# Patient Record
Sex: FEMALE | Race: WHITE | NOT HISPANIC OR LATINO | URBAN - METROPOLITAN AREA
[De-identification: names, ages, dates, MRNs, and addresses within clinical notes are randomized per-mention and may not be internally consistent; named-entity substitution may affect disease eponyms.]

---

## 2023-02-26 ENCOUNTER — OUTPATIENT (OUTPATIENT)
Dept: OUTPATIENT SERVICES | Facility: HOSPITAL | Age: 48
LOS: 1 days | End: 2023-02-26
Payer: COMMERCIAL

## 2023-02-26 ENCOUNTER — APPOINTMENT (OUTPATIENT)
Dept: MRI IMAGING | Facility: CLINIC | Age: 48
End: 2023-02-26
Payer: COMMERCIAL

## 2023-02-26 DIAGNOSIS — Z00.8 ENCOUNTER FOR OTHER GENERAL EXAMINATION: ICD-10-CM

## 2023-02-26 PROCEDURE — 73718 MRI LOWER EXTREMITY W/O DYE: CPT | Mod: 26,RT

## 2023-02-26 PROCEDURE — 73718 MRI LOWER EXTREMITY W/O DYE: CPT

## 2023-04-03 ENCOUNTER — APPOINTMENT (OUTPATIENT)
Dept: OBGYN | Facility: CLINIC | Age: 48
End: 2023-04-03
Payer: COMMERCIAL

## 2023-04-03 VITALS
HEIGHT: 61 IN | BODY MASS INDEX: 35.87 KG/M2 | DIASTOLIC BLOOD PRESSURE: 80 MMHG | WEIGHT: 190 LBS | SYSTOLIC BLOOD PRESSURE: 134 MMHG

## 2023-04-03 DIAGNOSIS — Z30.41 ENCOUNTER FOR SURVEILLANCE OF CONTRACEPTIVE PILLS: ICD-10-CM

## 2023-04-03 DIAGNOSIS — Z01.419 ENCOUNTER FOR GYNECOLOGICAL EXAMINATION (GENERAL) (ROUTINE) W/OUT ABNORMAL FINDINGS: ICD-10-CM

## 2023-04-03 PROCEDURE — 82270 OCCULT BLOOD FECES: CPT

## 2023-04-03 PROCEDURE — 99386 PREV VISIT NEW AGE 40-64: CPT

## 2023-04-03 NOTE — HISTORY OF PRESENT ILLNESS
[FreeTextEntry1] : Patient is a 47-year-old female presents for routine annual gynecologic examination and oral contraceptive pill surveillance and maintenance visit.  Patient is on Seasonique and states she is doing well and would like to continue this prescription.  Patient with a significant family history of a sister with breast cancer genetic testing was negative.  Patient's last mammogram and breast sonogram was 1 year ago.

## 2023-04-04 LAB — HPV HIGH+LOW RISK DNA PNL CVX: NOT DETECTED

## 2023-04-07 LAB — CYTOLOGY CVX/VAG DOC THIN PREP: ABNORMAL

## 2024-01-09 ENCOUNTER — INPATIENT (INPATIENT)
Facility: HOSPITAL | Age: 49
LOS: 2 days | Discharge: HOME | DRG: 641 | End: 2024-01-12
Attending: HOSPITALIST | Admitting: INTERNAL MEDICINE
Payer: COMMERCIAL

## 2024-01-09 VITALS — WEIGHT: 190.04 LBS | HEIGHT: 61 IN

## 2024-01-09 LAB
APPEARANCE UR: CLEAR — SIGNIFICANT CHANGE UP
APPEARANCE UR: CLEAR — SIGNIFICANT CHANGE UP
BASOPHILS # BLD AUTO: 0.03 K/UL — SIGNIFICANT CHANGE UP (ref 0–0.2)
BASOPHILS # BLD AUTO: 0.03 K/UL — SIGNIFICANT CHANGE UP (ref 0–0.2)
BASOPHILS NFR BLD AUTO: 0.2 % — SIGNIFICANT CHANGE UP (ref 0–2)
BASOPHILS NFR BLD AUTO: 0.2 % — SIGNIFICANT CHANGE UP (ref 0–2)
BILIRUB UR-MCNC: NEGATIVE — SIGNIFICANT CHANGE UP
BILIRUB UR-MCNC: NEGATIVE — SIGNIFICANT CHANGE UP
COLOR SPEC: YELLOW — SIGNIFICANT CHANGE UP
COLOR SPEC: YELLOW — SIGNIFICANT CHANGE UP
DIFF PNL FLD: NEGATIVE — SIGNIFICANT CHANGE UP
DIFF PNL FLD: NEGATIVE — SIGNIFICANT CHANGE UP
EOSINOPHIL # BLD AUTO: 0.15 K/UL — SIGNIFICANT CHANGE UP (ref 0–0.5)
EOSINOPHIL # BLD AUTO: 0.15 K/UL — SIGNIFICANT CHANGE UP (ref 0–0.5)
EOSINOPHIL NFR BLD AUTO: 1.1 % — SIGNIFICANT CHANGE UP (ref 0–6)
EOSINOPHIL NFR BLD AUTO: 1.1 % — SIGNIFICANT CHANGE UP (ref 0–6)
GLUCOSE UR QL: NEGATIVE MG/DL — SIGNIFICANT CHANGE UP
GLUCOSE UR QL: NEGATIVE MG/DL — SIGNIFICANT CHANGE UP
HCT VFR BLD CALC: 37.3 % — SIGNIFICANT CHANGE UP (ref 34.5–45)
HCT VFR BLD CALC: 37.3 % — SIGNIFICANT CHANGE UP (ref 34.5–45)
HGB BLD-MCNC: 12.5 G/DL — SIGNIFICANT CHANGE UP (ref 11.5–15.5)
HGB BLD-MCNC: 12.5 G/DL — SIGNIFICANT CHANGE UP (ref 11.5–15.5)
IMM GRANULOCYTES NFR BLD AUTO: 0.4 % — SIGNIFICANT CHANGE UP (ref 0–0.9)
IMM GRANULOCYTES NFR BLD AUTO: 0.4 % — SIGNIFICANT CHANGE UP (ref 0–0.9)
KETONES UR-MCNC: NEGATIVE MG/DL — SIGNIFICANT CHANGE UP
KETONES UR-MCNC: NEGATIVE MG/DL — SIGNIFICANT CHANGE UP
LEUKOCYTE ESTERASE UR-ACNC: ABNORMAL
LEUKOCYTE ESTERASE UR-ACNC: ABNORMAL
LYMPHOCYTES # BLD AUTO: 29.3 % — SIGNIFICANT CHANGE UP (ref 13–44)
LYMPHOCYTES # BLD AUTO: 29.3 % — SIGNIFICANT CHANGE UP (ref 13–44)
LYMPHOCYTES # BLD AUTO: 4.11 K/UL — HIGH (ref 1–3.3)
LYMPHOCYTES # BLD AUTO: 4.11 K/UL — HIGH (ref 1–3.3)
MCHC RBC-ENTMCNC: 27.8 PG — SIGNIFICANT CHANGE UP (ref 27–34)
MCHC RBC-ENTMCNC: 27.8 PG — SIGNIFICANT CHANGE UP (ref 27–34)
MCHC RBC-ENTMCNC: 33.5 GM/DL — SIGNIFICANT CHANGE UP (ref 32–36)
MCHC RBC-ENTMCNC: 33.5 GM/DL — SIGNIFICANT CHANGE UP (ref 32–36)
MCV RBC AUTO: 83.1 FL — SIGNIFICANT CHANGE UP (ref 80–100)
MCV RBC AUTO: 83.1 FL — SIGNIFICANT CHANGE UP (ref 80–100)
MONOCYTES # BLD AUTO: 0.93 K/UL — HIGH (ref 0–0.9)
MONOCYTES # BLD AUTO: 0.93 K/UL — HIGH (ref 0–0.9)
MONOCYTES NFR BLD AUTO: 6.6 % — SIGNIFICANT CHANGE UP (ref 2–14)
MONOCYTES NFR BLD AUTO: 6.6 % — SIGNIFICANT CHANGE UP (ref 2–14)
NEUTROPHILS # BLD AUTO: 8.77 K/UL — HIGH (ref 1.8–7.4)
NEUTROPHILS # BLD AUTO: 8.77 K/UL — HIGH (ref 1.8–7.4)
NEUTROPHILS NFR BLD AUTO: 62.4 % — SIGNIFICANT CHANGE UP (ref 43–77)
NEUTROPHILS NFR BLD AUTO: 62.4 % — SIGNIFICANT CHANGE UP (ref 43–77)
NITRITE UR-MCNC: NEGATIVE — SIGNIFICANT CHANGE UP
NITRITE UR-MCNC: NEGATIVE — SIGNIFICANT CHANGE UP
PH UR: 5.5 — SIGNIFICANT CHANGE UP (ref 5–8)
PH UR: 5.5 — SIGNIFICANT CHANGE UP (ref 5–8)
PLATELET # BLD AUTO: 311 K/UL — SIGNIFICANT CHANGE UP (ref 150–400)
PLATELET # BLD AUTO: 311 K/UL — SIGNIFICANT CHANGE UP (ref 150–400)
PROT UR-MCNC: SIGNIFICANT CHANGE UP MG/DL
PROT UR-MCNC: SIGNIFICANT CHANGE UP MG/DL
RBC # BLD: 4.49 M/UL — SIGNIFICANT CHANGE UP (ref 3.8–5.2)
RBC # BLD: 4.49 M/UL — SIGNIFICANT CHANGE UP (ref 3.8–5.2)
RBC # FLD: 13.9 % — SIGNIFICANT CHANGE UP (ref 10.3–14.5)
RBC # FLD: 13.9 % — SIGNIFICANT CHANGE UP (ref 10.3–14.5)
SP GR SPEC: 1.01 — SIGNIFICANT CHANGE UP (ref 1–1.03)
SP GR SPEC: 1.01 — SIGNIFICANT CHANGE UP (ref 1–1.03)
UROBILINOGEN FLD QL: 0.2 MG/DL — SIGNIFICANT CHANGE UP (ref 0.2–1)
UROBILINOGEN FLD QL: 0.2 MG/DL — SIGNIFICANT CHANGE UP (ref 0.2–1)
WBC # BLD: 14.04 K/UL — HIGH (ref 3.8–10.5)
WBC # BLD: 14.04 K/UL — HIGH (ref 3.8–10.5)
WBC # FLD AUTO: 14.04 K/UL — HIGH (ref 3.8–10.5)
WBC # FLD AUTO: 14.04 K/UL — HIGH (ref 3.8–10.5)

## 2024-01-09 PROCEDURE — 99285 EMERGENCY DEPT VISIT HI MDM: CPT

## 2024-01-09 RX ORDER — SODIUM CHLORIDE 9 MG/ML
2000 INJECTION INTRAMUSCULAR; INTRAVENOUS; SUBCUTANEOUS ONCE
Refills: 0 | Status: COMPLETED | OUTPATIENT
Start: 2024-01-09 | End: 2024-01-09

## 2024-01-09 RX ADMIN — SODIUM CHLORIDE 2000 MILLILITER(S): 9 INJECTION INTRAMUSCULAR; INTRAVENOUS; SUBCUTANEOUS at 23:40

## 2024-01-09 NOTE — ED ADULT NURSE NOTE - OBJECTIVE STATEMENT
Pt. presents to ED c/o abnormal labs. Pt. reports having lab work done Friday and was told to come to the ED tonight due to decreased kidney function and elevated calcium. Denies hx kidney issues, as per pt. she feels her abdomen is bloated this past few days, denies Vomiting/diarrhea and urinary prob,

## 2024-01-09 NOTE — ED ADULT TRIAGE NOTE - CHIEF COMPLAINT QUOTE
Pt. presents to ED c/o abnormal labs. Pt. reports having labwork done Friday and was told to come to the ED tonight due to decreased kidney function and elevated calcium. Pt. endorsing urinary frequency at this time. No other complaints. Denies hx kidney issues

## 2024-01-09 NOTE — ED ADULT NURSE NOTE - PRO INTERPRETER NEED 2
Pre-application: Motor, sensory, and vascular responses intact in the injured extremity./Post-application: Motor, sensory, and vascular responses intact in the injured extremity.
English

## 2024-01-10 DIAGNOSIS — E83.52 HYPERCALCEMIA: ICD-10-CM

## 2024-01-10 LAB
24R-OH-CALCIDIOL SERPL-MCNC: 33.2 NG/ML — SIGNIFICANT CHANGE UP (ref 30–80)
24R-OH-CALCIDIOL SERPL-MCNC: 33.2 NG/ML — SIGNIFICANT CHANGE UP (ref 30–80)
ADD ON TEST-SPECIMEN IN LAB: SIGNIFICANT CHANGE UP
ALBUMIN SERPL ELPH-MCNC: 4 G/DL — SIGNIFICANT CHANGE UP (ref 3.3–5)
ALBUMIN SERPL ELPH-MCNC: 4 G/DL — SIGNIFICANT CHANGE UP (ref 3.3–5)
ALBUMIN SERPL ELPH-MCNC: 4.3 G/DL — SIGNIFICANT CHANGE UP (ref 3.3–5)
ALBUMIN SERPL ELPH-MCNC: 4.3 G/DL — SIGNIFICANT CHANGE UP (ref 3.3–5)
ALP SERPL-CCNC: 118 U/L — SIGNIFICANT CHANGE UP (ref 40–120)
ALP SERPL-CCNC: 118 U/L — SIGNIFICANT CHANGE UP (ref 40–120)
ALP SERPL-CCNC: 119 U/L — SIGNIFICANT CHANGE UP (ref 40–120)
ALP SERPL-CCNC: 119 U/L — SIGNIFICANT CHANGE UP (ref 40–120)
ALT FLD-CCNC: 39 U/L — SIGNIFICANT CHANGE UP (ref 12–78)
ALT FLD-CCNC: 39 U/L — SIGNIFICANT CHANGE UP (ref 12–78)
ALT FLD-CCNC: 44 U/L — SIGNIFICANT CHANGE UP (ref 12–78)
ALT FLD-CCNC: 44 U/L — SIGNIFICANT CHANGE UP (ref 12–78)
ANION GAP SERPL CALC-SCNC: 5 MMOL/L — SIGNIFICANT CHANGE UP (ref 5–17)
ANION GAP SERPL CALC-SCNC: 5 MMOL/L — SIGNIFICANT CHANGE UP (ref 5–17)
ANION GAP SERPL CALC-SCNC: 7 MMOL/L — SIGNIFICANT CHANGE UP (ref 5–17)
ANION GAP SERPL CALC-SCNC: 7 MMOL/L — SIGNIFICANT CHANGE UP (ref 5–17)
AST SERPL-CCNC: 27 U/L — SIGNIFICANT CHANGE UP (ref 15–37)
AST SERPL-CCNC: 27 U/L — SIGNIFICANT CHANGE UP (ref 15–37)
AST SERPL-CCNC: 30 U/L — SIGNIFICANT CHANGE UP (ref 15–37)
AST SERPL-CCNC: 30 U/L — SIGNIFICANT CHANGE UP (ref 15–37)
BACTERIA # UR AUTO: NEGATIVE /HPF — SIGNIFICANT CHANGE UP
BACTERIA # UR AUTO: NEGATIVE /HPF — SIGNIFICANT CHANGE UP
BILIRUB SERPL-MCNC: 0.6 MG/DL — SIGNIFICANT CHANGE UP (ref 0.2–1.2)
BILIRUB SERPL-MCNC: 0.6 MG/DL — SIGNIFICANT CHANGE UP (ref 0.2–1.2)
BILIRUB SERPL-MCNC: 0.7 MG/DL — SIGNIFICANT CHANGE UP (ref 0.2–1.2)
BILIRUB SERPL-MCNC: 0.7 MG/DL — SIGNIFICANT CHANGE UP (ref 0.2–1.2)
BUN SERPL-MCNC: 28 MG/DL — HIGH (ref 7–23)
BUN SERPL-MCNC: 28 MG/DL — HIGH (ref 7–23)
BUN SERPL-MCNC: 36 MG/DL — HIGH (ref 7–23)
BUN SERPL-MCNC: 36 MG/DL — HIGH (ref 7–23)
CA-I BLD-SCNC: 1.52 MMOL/L — HIGH (ref 1.15–1.33)
CA-I BLD-SCNC: 1.52 MMOL/L — HIGH (ref 1.15–1.33)
CALCIUM SERPL-MCNC: 11.6 MG/DL — HIGH (ref 8.5–10.1)
CALCIUM SERPL-MCNC: 11.6 MG/DL — HIGH (ref 8.5–10.1)
CALCIUM SERPL-MCNC: 11.9 MG/DL — HIGH (ref 8.4–10.5)
CALCIUM SERPL-MCNC: 11.9 MG/DL — HIGH (ref 8.4–10.5)
CALCIUM SERPL-MCNC: 11.9 MG/DL — HIGH (ref 8.5–10.1)
CALCIUM SERPL-MCNC: 11.9 MG/DL — HIGH (ref 8.5–10.1)
CALCIUM SERPL-MCNC: 13.2 MG/DL — CRITICAL HIGH (ref 8.5–10.1)
CALCIUM SERPL-MCNC: 13.2 MG/DL — CRITICAL HIGH (ref 8.5–10.1)
CAST: 4 /LPF — SIGNIFICANT CHANGE UP (ref 0–4)
CAST: 4 /LPF — SIGNIFICANT CHANGE UP (ref 0–4)
CHLORIDE SERPL-SCNC: 103 MMOL/L — SIGNIFICANT CHANGE UP (ref 96–108)
CHLORIDE SERPL-SCNC: 103 MMOL/L — SIGNIFICANT CHANGE UP (ref 96–108)
CHLORIDE SERPL-SCNC: 108 MMOL/L — SIGNIFICANT CHANGE UP (ref 96–108)
CHLORIDE SERPL-SCNC: 108 MMOL/L — SIGNIFICANT CHANGE UP (ref 96–108)
CO2 SERPL-SCNC: 29 MMOL/L — SIGNIFICANT CHANGE UP (ref 22–31)
CO2 SERPL-SCNC: 29 MMOL/L — SIGNIFICANT CHANGE UP (ref 22–31)
CO2 SERPL-SCNC: 30 MMOL/L — SIGNIFICANT CHANGE UP (ref 22–31)
CO2 SERPL-SCNC: 30 MMOL/L — SIGNIFICANT CHANGE UP (ref 22–31)
CREAT SERPL-MCNC: 2.3 MG/DL — HIGH (ref 0.5–1.3)
CREAT SERPL-MCNC: 2.3 MG/DL — HIGH (ref 0.5–1.3)
CREAT SERPL-MCNC: 2.65 MG/DL — HIGH (ref 0.5–1.3)
CREAT SERPL-MCNC: 2.65 MG/DL — HIGH (ref 0.5–1.3)
EGFR: 21 ML/MIN/1.73M2 — LOW
EGFR: 21 ML/MIN/1.73M2 — LOW
EGFR: 25 ML/MIN/1.73M2 — LOW
EGFR: 25 ML/MIN/1.73M2 — LOW
GLUCOSE SERPL-MCNC: 105 MG/DL — HIGH (ref 70–99)
GLUCOSE SERPL-MCNC: 105 MG/DL — HIGH (ref 70–99)
GLUCOSE SERPL-MCNC: 153 MG/DL — HIGH (ref 70–99)
GLUCOSE SERPL-MCNC: 153 MG/DL — HIGH (ref 70–99)
HCG SERPL-ACNC: 5 MIU/ML — HIGH
HCG SERPL-ACNC: 5 MIU/ML — HIGH
LDH SERPL L TO P-CCNC: 108 U/L — SIGNIFICANT CHANGE UP (ref 84–241)
LDH SERPL L TO P-CCNC: 108 U/L — SIGNIFICANT CHANGE UP (ref 84–241)
MAGNESIUM SERPL-MCNC: 1.8 MG/DL — SIGNIFICANT CHANGE UP (ref 1.6–2.6)
MAGNESIUM SERPL-MCNC: 1.8 MG/DL — SIGNIFICANT CHANGE UP (ref 1.6–2.6)
POTASSIUM SERPL-MCNC: 3.4 MMOL/L — LOW (ref 3.5–5.3)
POTASSIUM SERPL-MCNC: 3.4 MMOL/L — LOW (ref 3.5–5.3)
POTASSIUM SERPL-MCNC: 3.8 MMOL/L — SIGNIFICANT CHANGE UP (ref 3.5–5.3)
POTASSIUM SERPL-MCNC: 3.8 MMOL/L — SIGNIFICANT CHANGE UP (ref 3.5–5.3)
POTASSIUM SERPL-SCNC: 3.4 MMOL/L — LOW (ref 3.5–5.3)
POTASSIUM SERPL-SCNC: 3.4 MMOL/L — LOW (ref 3.5–5.3)
POTASSIUM SERPL-SCNC: 3.8 MMOL/L — SIGNIFICANT CHANGE UP (ref 3.5–5.3)
POTASSIUM SERPL-SCNC: 3.8 MMOL/L — SIGNIFICANT CHANGE UP (ref 3.5–5.3)
PROT SERPL-MCNC: 7.3 G/DL — SIGNIFICANT CHANGE UP (ref 6–8.3)
PROT SERPL-MCNC: 7.3 G/DL — SIGNIFICANT CHANGE UP (ref 6–8.3)
PROT SERPL-MCNC: 8.2 GM/DL — SIGNIFICANT CHANGE UP (ref 6–8.3)
PROT SERPL-MCNC: 8.2 GM/DL — SIGNIFICANT CHANGE UP (ref 6–8.3)
PROT SERPL-MCNC: 8.8 GM/DL — HIGH (ref 6–8.3)
PROT SERPL-MCNC: 8.8 GM/DL — HIGH (ref 6–8.3)
PTH-INTACT FLD-MCNC: 2 PG/ML — LOW (ref 15–65)
PTH-INTACT FLD-MCNC: 2 PG/ML — LOW (ref 15–65)
RBC CASTS # UR COMP ASSIST: 1 /HPF — SIGNIFICANT CHANGE UP (ref 0–4)
RBC CASTS # UR COMP ASSIST: 1 /HPF — SIGNIFICANT CHANGE UP (ref 0–4)
SODIUM SERPL-SCNC: 140 MMOL/L — SIGNIFICANT CHANGE UP (ref 135–145)
SODIUM SERPL-SCNC: 140 MMOL/L — SIGNIFICANT CHANGE UP (ref 135–145)
SODIUM SERPL-SCNC: 142 MMOL/L — SIGNIFICANT CHANGE UP (ref 135–145)
SODIUM SERPL-SCNC: 142 MMOL/L — SIGNIFICANT CHANGE UP (ref 135–145)
SQUAMOUS # UR AUTO: 3 /HPF — SIGNIFICANT CHANGE UP (ref 0–5)
SQUAMOUS # UR AUTO: 3 /HPF — SIGNIFICANT CHANGE UP (ref 0–5)
TSH SERPL-MCNC: 0.01 UU/ML — LOW (ref 0.34–4.82)
TSH SERPL-MCNC: 0.01 UU/ML — LOW (ref 0.34–4.82)
WBC UR QL: 15 /HPF — HIGH (ref 0–5)
WBC UR QL: 15 /HPF — HIGH (ref 0–5)

## 2024-01-10 PROCEDURE — 82306 VITAMIN D 25 HYDROXY: CPT

## 2024-01-10 PROCEDURE — 85027 COMPLETE CBC AUTOMATED: CPT

## 2024-01-10 PROCEDURE — 93005 ELECTROCARDIOGRAM TRACING: CPT

## 2024-01-10 PROCEDURE — 80069 RENAL FUNCTION PANEL: CPT

## 2024-01-10 PROCEDURE — 84155 ASSAY OF PROTEIN SERUM: CPT

## 2024-01-10 PROCEDURE — 71045 X-RAY EXAM CHEST 1 VIEW: CPT

## 2024-01-10 PROCEDURE — 76770 US EXAM ABDO BACK WALL COMP: CPT | Mod: 26

## 2024-01-10 PROCEDURE — 86334 IMMUNOFIX E-PHORESIS SERUM: CPT

## 2024-01-10 PROCEDURE — 83615 LACTATE (LD) (LDH) ENZYME: CPT

## 2024-01-10 PROCEDURE — 82310 ASSAY OF CALCIUM: CPT

## 2024-01-10 PROCEDURE — 84443 ASSAY THYROID STIM HORMONE: CPT

## 2024-01-10 PROCEDURE — 71045 X-RAY EXAM CHEST 1 VIEW: CPT | Mod: 26

## 2024-01-10 PROCEDURE — 93010 ELECTROCARDIOGRAM REPORT: CPT

## 2024-01-10 PROCEDURE — 99223 1ST HOSP IP/OBS HIGH 75: CPT

## 2024-01-10 PROCEDURE — 83970 ASSAY OF PARATHORMONE: CPT

## 2024-01-10 PROCEDURE — 36415 COLL VENOUS BLD VENIPUNCTURE: CPT

## 2024-01-10 PROCEDURE — 84165 PROTEIN E-PHORESIS SERUM: CPT

## 2024-01-10 PROCEDURE — 76770 US EXAM ABDO BACK WALL COMP: CPT

## 2024-01-10 PROCEDURE — 80048 BASIC METABOLIC PNL TOTAL CA: CPT

## 2024-01-10 PROCEDURE — 80053 COMPREHEN METABOLIC PANEL: CPT

## 2024-01-10 PROCEDURE — 82330 ASSAY OF CALCIUM: CPT

## 2024-01-10 PROCEDURE — 83519 RIA NONANTIBODY: CPT

## 2024-01-10 RX ORDER — FAMOTIDINE 10 MG/ML
40 INJECTION INTRAVENOUS DAILY
Refills: 0 | Status: DISCONTINUED | OUTPATIENT
Start: 2024-01-10 | End: 2024-01-10

## 2024-01-10 RX ORDER — SODIUM CHLORIDE 9 MG/ML
1000 INJECTION INTRAMUSCULAR; INTRAVENOUS; SUBCUTANEOUS
Refills: 0 | Status: DISCONTINUED | OUTPATIENT
Start: 2024-01-10 | End: 2024-01-12

## 2024-01-10 RX ORDER — FAMOTIDINE 10 MG/ML
40 INJECTION INTRAVENOUS
Refills: 0 | Status: DISCONTINUED | OUTPATIENT
Start: 2024-01-10 | End: 2024-01-11

## 2024-01-10 RX ORDER — ROSUVASTATIN CALCIUM 5 MG/1
1 TABLET ORAL
Refills: 0 | DISCHARGE

## 2024-01-10 RX ORDER — LEVOTHYROXINE SODIUM 125 MCG
175 TABLET ORAL
Refills: 0 | Status: DISCONTINUED | OUTPATIENT
Start: 2024-01-10 | End: 2024-01-12

## 2024-01-10 RX ORDER — POTASSIUM CHLORIDE 20 MEQ
20 PACKET (EA) ORAL ONCE
Refills: 0 | Status: COMPLETED | OUTPATIENT
Start: 2024-01-10 | End: 2024-01-10

## 2024-01-10 RX ORDER — LEVOTHYROXINE SODIUM 125 MCG
0.5 TABLET ORAL
Refills: 0 | DISCHARGE

## 2024-01-10 RX ORDER — LEVOTHYROXINE SODIUM 125 MCG
87.5 TABLET ORAL
Refills: 0 | Status: DISCONTINUED | OUTPATIENT
Start: 2024-01-10 | End: 2024-01-12

## 2024-01-10 RX ORDER — LANOLIN ALCOHOL/MO/W.PET/CERES
3 CREAM (GRAM) TOPICAL AT BEDTIME
Refills: 0 | Status: DISCONTINUED | OUTPATIENT
Start: 2024-01-10 | End: 2024-01-12

## 2024-01-10 RX ORDER — FAMOTIDINE 10 MG/ML
1 INJECTION INTRAVENOUS
Refills: 0 | DISCHARGE

## 2024-01-10 RX ORDER — OMEPRAZOLE 10 MG/1
1 CAPSULE, DELAYED RELEASE ORAL
Refills: 0 | DISCHARGE

## 2024-01-10 RX ORDER — ONDANSETRON 8 MG/1
4 TABLET, FILM COATED ORAL EVERY 8 HOURS
Refills: 0 | Status: DISCONTINUED | OUTPATIENT
Start: 2024-01-10 | End: 2024-01-12

## 2024-01-10 RX ORDER — LEVOTHYROXINE SODIUM 125 MCG
1 TABLET ORAL
Refills: 0 | DISCHARGE

## 2024-01-10 RX ORDER — SERTRALINE 25 MG/1
200 TABLET, FILM COATED ORAL DAILY
Refills: 0 | Status: DISCONTINUED | OUTPATIENT
Start: 2024-01-10 | End: 2024-01-12

## 2024-01-10 RX ORDER — ATORVASTATIN CALCIUM 80 MG/1
80 TABLET, FILM COATED ORAL AT BEDTIME
Refills: 0 | Status: DISCONTINUED | OUTPATIENT
Start: 2024-01-10 | End: 2024-01-12

## 2024-01-10 RX ORDER — TRAZODONE HCL 50 MG
1 TABLET ORAL
Refills: 0 | DISCHARGE

## 2024-01-10 RX ORDER — ACETAMINOPHEN 500 MG
650 TABLET ORAL EVERY 6 HOURS
Refills: 0 | Status: DISCONTINUED | OUTPATIENT
Start: 2024-01-10 | End: 2024-01-12

## 2024-01-10 RX ORDER — SERTRALINE 25 MG/1
2 TABLET, FILM COATED ORAL
Refills: 0 | DISCHARGE

## 2024-01-10 RX ORDER — TRAZODONE HCL 50 MG
100 TABLET ORAL AT BEDTIME
Refills: 0 | Status: DISCONTINUED | OUTPATIENT
Start: 2024-01-10 | End: 2024-01-12

## 2024-01-10 RX ORDER — PANTOPRAZOLE SODIUM 20 MG/1
40 TABLET, DELAYED RELEASE ORAL
Refills: 0 | Status: DISCONTINUED | OUTPATIENT
Start: 2024-01-10 | End: 2024-01-12

## 2024-01-10 RX ORDER — HEPARIN SODIUM 5000 [USP'U]/ML
5000 INJECTION INTRAVENOUS; SUBCUTANEOUS EVERY 12 HOURS
Refills: 0 | Status: DISCONTINUED | OUTPATIENT
Start: 2024-01-10 | End: 2024-01-12

## 2024-01-10 RX ADMIN — FAMOTIDINE 40 MILLIGRAM(S): 10 INJECTION INTRAVENOUS at 11:03

## 2024-01-10 RX ADMIN — SERTRALINE 200 MILLIGRAM(S): 25 TABLET, FILM COATED ORAL at 11:03

## 2024-01-10 RX ADMIN — Medication 20 MILLIEQUIVALENT(S): at 14:35

## 2024-01-10 RX ADMIN — FAMOTIDINE 40 MILLIGRAM(S): 10 INJECTION INTRAVENOUS at 23:00

## 2024-01-10 RX ADMIN — Medication 100 MILLIGRAM(S): at 22:20

## 2024-01-10 RX ADMIN — SODIUM CHLORIDE 150 MILLILITER(S): 9 INJECTION INTRAMUSCULAR; INTRAVENOUS; SUBCUTANEOUS at 11:04

## 2024-01-10 NOTE — PATIENT PROFILE ADULT - FALL HARM RISK - UNIVERSAL INTERVENTIONS
Bed in lowest position, wheels locked, appropriate side rails in place/Call bell, personal items and telephone in reach/Instruct patient to call for assistance before getting out of bed or chair/Non-slip footwear when patient is out of bed/[place] to call system/Physically safe environment - no spills, clutter or unnecessary equipment/Purposeful Proactive Rounding/Room/bathroom lighting operational, light cord in reach Bed in lowest position, wheels locked, appropriate side rails in place/Call bell, personal items and telephone in reach/Instruct patient to call for assistance before getting out of bed or chair/Non-slip footwear when patient is out of bed/[place] to call system/Physically safe environment - no spills, clutter or unnecessary equipment/Purposeful Proactive Rounding/Room/bathroom lighting operational, light cord in reach

## 2024-01-10 NOTE — CONSULT NOTE ADULT - SUBJECTIVE AND OBJECTIVE BOX
Chief complaints. sent to ED due to abnormal labs    HPI:  47 yo woman with pmhx of Thyroid CA post total thyroidectomy, hx of Hypocalcemia on Calcium and Rocaltrol 1.5 mcg daily since thyroidectomy.  Had been on same Calcium and Vit D supp for last 25 years with Calcium level WNL.  In 11/2023, noted for Caldium level of >11, Calcium supp was decreased to daily dosing.  Developed abdominal discomfort with bloating and decreased appetite and occ nausea leading to GI evaluation with Dr Fierro.  Lab done revealed Clacium of 12.5 and creat of 2.5 with no previous hx of CKD and sent to ED.  In ED, Calcium of 13.5 and Creat of 2.6.  Denies any excessive mild consumption or any new OTC meds.  No NSAIDS.    PMHX and PSHX.  --hx of Thyroid CA --total thyroidectomy  without known reimplant of parathyroid gland--25 years ago.  --Hypocalcemia since thyroidectomy  --Hx of Ureterocele repair as a child  --Post recent spinal surgery.  --Hx of Gastric sleeve 2014.    Home Medications:   * Patient Currently Takes Medications as of 10-Jace-2024 08:56 documented in Structured Notes  · 	levothyroxine 175 mcg (0.175 mg) oral tablet: Last Dose Taken:  , 1 tab(s) orally 6 times a week Mon-Sat  · 	famotidine 40 mg oral tablet: Last Dose Taken:  , 1 tab(s) orally once a day (at bedtime)  · 	levothyroxine 175 mcg (0.175 mg) oral tablet: Last Dose Taken:  , 0.5 tab(s) orally once a week on Sunday  · 	omeprazole 40 mg oral delayed release capsule: Last Dose Taken:  , 1 cap(s) orally once a day  · 	traZODone 100 mg oral tablet: Last Dose Taken:  , 1 tab(s) orally once a day (at bedtime)  · 	sertraline 100 mg oral tablet: Last Dose Taken:  , 2 tab(s) orally once a day (in the morning)  · 	calcitriol 0.5 mcg oral capsule: Last Dose Taken:  , 1 cap(s) orally 3 times a day  · 	rosuvastatin 20 mg oral tablet: Last Dose Taken:  , 1 tab(s) orally once a day    FAMILY HISTORY: N/C    SOCIAL HISTORY :  No hx of smoking or ETOH    Allergies :  Reglan (Unknown)  shellfish (Unknown)  shellfish (Rash)    REVIEW OF SYSTEMS :    MEDICATIONS  (STANDING):  atorvastatin 80 milliGRAM(s) Oral at bedtime  famotidine    Tablet 40 milliGRAM(s) Oral daily  heparin   Injectable 5000 Unit(s) SubCutaneous every 12 hours  levothyroxine 175 MICROGram(s) Oral <User Schedule>  levothyroxine 87.5 MICROGram(s) Oral <User Schedule>  pantoprazole    Tablet 40 milliGRAM(s) Oral before breakfast  potassium chloride    Tablet ER 20 milliEquivalent(s) Oral once  sertraline 200 milliGRAM(s) Oral daily  sodium chloride 0.9%. 1000 milliLiter(s) (150 mL/Hr) IV Continuous <Continuous>  traZODone 100 milliGRAM(s) Oral at bedtime    MEDICATIONS  (PRN):  acetaminophen     Tablet .. 650 milliGRAM(s) Oral every 6 hours PRN Temp greater or equal to 38C (100.4F), Mild Pain (1 - 3)  aluminum hydroxide/magnesium hydroxide/simethicone Suspension 30 milliLiter(s) Oral every 4 hours PRN Dyspepsia  melatonin 3 milliGRAM(s) Oral at bedtime PRN Insomnia  ondansetron Injectable 4 milliGRAM(s) IV Push every 8 hours PRN Nausea and/or Vomiting      Vital Signs Last 24 Hrs  T(C): 37 (10 Jace 2024 06:27), Max: 37 (10 Jace 2024 06:27)  T(F): 98.6 (10 Jace 2024 06:27), Max: 98.6 (10 Jace 2024 06:27)  HR: 79 (10 Jace 2024 06:27) (78 - 91)  BP: 137/72 (10 Jace 2024 06:27) (132/62 - 150/70)  BP(mean): 91 (10 Jace 2024 06:27) (80 - 92)  RR: 18 (10 Jace 2024 06:27) (16 - 18)  SpO2: 98% (10 Jace 2024 06:27) (95% - 98%)    Parameters below as of 10 Jace 2024 06:27  Patient On (Oxygen Delivery Method): room air      Daily Height in cm: 154.94 (09 Jan 2024 22:34)    Daily   I&O's Summary      PHYSICAL EXAM:  GEN: no distress  HEENT: WNL  NECK : supple  CV: S1S2 RRR  ABD: soft  EXT: no edema    LABS:                        12.5   14.04 )-----------( 311      ( 09 Jan 2024 23:22 )             37.3     01-10    142  |  108  |  28<H>  ----------------------------<  153<H>  3.4<L>   |  29  |  2.30<H>    Ca    11.6<H>      10 Jace 2024 09:39  Mg     1.8     01-09    TPro  8.2  /  Alb  4.0  /  TBili  0.7  /  DBili  x   /  AST  27  /  ALT  39  /  AlkPhos  118  01-10      Urinalysis Basic - ( 10 Jace 2024 09:39 )    Color: x / Appearance: x / SG: x / pH: x  Gluc: 153 mg/dL / Ketone: x  / Bili: x / Urobili: x   Blood: x / Protein: x / Nitrite: x   Leuk Esterase: x / RBC: x / WBC x   Sq Epi: x / Non Sq Epi: x / Bacteria: x      Magnesium: 1.8 mg/dL (01-09 @ 23:22)       Chief complaints. sent to ED due to abnormal labs    HPI:  49 yo woman with pmhx of Thyroid CA post total thyroidectomy, hx of Hypocalcemia on Calcium and Rocaltrol 1.5 mcg daily since thyroidectomy.  Had been on same Calcium and Vit D supp for last 25 years with Calcium level WNL.  In 11/2023, noted for Caldium level of >11, Calcium supp was decreased to daily dosing.  Developed abdominal discomfort with bloating and decreased appetite and occ nausea leading to GI evaluation with Dr Fierro.  Lab done revealed Clacium of 12.5 and creat of 2.5 with no previous hx of CKD and sent to ED.  In ED, Calcium of 13.5 and Creat of 2.6.  Denies any excessive mild consumption or any new OTC meds.  No NSAIDS.    PMHX and PSHX.  --hx of Thyroid CA --total thyroidectomy  without known reimplant of parathyroid gland--25 years ago.  --Hypocalcemia since thyroidectomy  --Hx of Ureterocele repair as a child  --Post recent spinal surgery.  --Hx of Gastric sleeve 2014.    Home Medications:   * Patient Currently Takes Medications as of 10-Jace-2024 08:56 documented in Structured Notes  · 	levothyroxine 175 mcg (0.175 mg) oral tablet: Last Dose Taken:  , 1 tab(s) orally 6 times a week Mon-Sat  · 	famotidine 40 mg oral tablet: Last Dose Taken:  , 1 tab(s) orally once a day (at bedtime)  · 	levothyroxine 175 mcg (0.175 mg) oral tablet: Last Dose Taken:  , 0.5 tab(s) orally once a week on Sunday  · 	omeprazole 40 mg oral delayed release capsule: Last Dose Taken:  , 1 cap(s) orally once a day  · 	traZODone 100 mg oral tablet: Last Dose Taken:  , 1 tab(s) orally once a day (at bedtime)  · 	sertraline 100 mg oral tablet: Last Dose Taken:  , 2 tab(s) orally once a day (in the morning)  · 	calcitriol 0.5 mcg oral capsule: Last Dose Taken:  , 1 cap(s) orally 3 times a day  · 	rosuvastatin 20 mg oral tablet: Last Dose Taken:  , 1 tab(s) orally once a day    FAMILY HISTORY: N/C    SOCIAL HISTORY :  No hx of smoking or ETOH    Allergies :  Reglan (Unknown)  shellfish (Unknown)  shellfish (Rash)    REVIEW OF SYSTEMS :    MEDICATIONS  (STANDING):  atorvastatin 80 milliGRAM(s) Oral at bedtime  famotidine    Tablet 40 milliGRAM(s) Oral daily  heparin   Injectable 5000 Unit(s) SubCutaneous every 12 hours  levothyroxine 175 MICROGram(s) Oral <User Schedule>  levothyroxine 87.5 MICROGram(s) Oral <User Schedule>  pantoprazole    Tablet 40 milliGRAM(s) Oral before breakfast  potassium chloride    Tablet ER 20 milliEquivalent(s) Oral once  sertraline 200 milliGRAM(s) Oral daily  sodium chloride 0.9%. 1000 milliLiter(s) (150 mL/Hr) IV Continuous <Continuous>  traZODone 100 milliGRAM(s) Oral at bedtime    MEDICATIONS  (PRN):  acetaminophen     Tablet .. 650 milliGRAM(s) Oral every 6 hours PRN Temp greater or equal to 38C (100.4F), Mild Pain (1 - 3)  aluminum hydroxide/magnesium hydroxide/simethicone Suspension 30 milliLiter(s) Oral every 4 hours PRN Dyspepsia  melatonin 3 milliGRAM(s) Oral at bedtime PRN Insomnia  ondansetron Injectable 4 milliGRAM(s) IV Push every 8 hours PRN Nausea and/or Vomiting      Vital Signs Last 24 Hrs  T(C): 37 (10 Jace 2024 06:27), Max: 37 (10 Jace 2024 06:27)  T(F): 98.6 (10 Jace 2024 06:27), Max: 98.6 (10 Jace 2024 06:27)  HR: 79 (10 Jace 2024 06:27) (78 - 91)  BP: 137/72 (10 Jace 2024 06:27) (132/62 - 150/70)  BP(mean): 91 (10 Jace 2024 06:27) (80 - 92)  RR: 18 (10 Jace 2024 06:27) (16 - 18)  SpO2: 98% (10 Jace 2024 06:27) (95% - 98%)    Parameters below as of 10 Jace 2024 06:27  Patient On (Oxygen Delivery Method): room air      Daily Height in cm: 154.94 (09 Jan 2024 22:34)    Daily   I&O's Summary      PHYSICAL EXAM:  GEN: no distress  HEENT: WNL  NECK : supple  CV: S1S2 RRR  ABD: soft  EXT: no edema    LABS:                        12.5   14.04 )-----------( 311      ( 09 Jan 2024 23:22 )             37.3     01-10    142  |  108  |  28<H>  ----------------------------<  153<H>  3.4<L>   |  29  |  2.30<H>    Ca    11.6<H>      10 Jace 2024 09:39  Mg     1.8     01-09    TPro  8.2  /  Alb  4.0  /  TBili  0.7  /  DBili  x   /  AST  27  /  ALT  39  /  AlkPhos  118  01-10      Urinalysis Basic - ( 10 Jace 2024 09:39 )    Color: x / Appearance: x / SG: x / pH: x  Gluc: 153 mg/dL / Ketone: x  / Bili: x / Urobili: x   Blood: x / Protein: x / Nitrite: x   Leuk Esterase: x / RBC: x / WBC x   Sq Epi: x / Non Sq Epi: x / Bacteria: x      Magnesium: 1.8 mg/dL (01-09 @ 23:22)

## 2024-01-10 NOTE — ED PROVIDER NOTE - PHYSICAL EXAMINATION
GEN: Patient awake alert NAD.   HEENT: normocephalic, atraumatic, EOMI, no scleral icterus, moist MM  CARDIAC: RRR, S1, S2, no murmur.   PULM: CTA B/L no wheeze, rhonchi, rales.   ABD: soft NT, ND, no rebound no guarding, no CVA tenderness.   MSK: Moving all extremities, no edema. 5/5 strength and full ROM in all extremities.     NEURO: A&Ox3, gait normal, no focal neurological deficits, CN 2-12 grossly intact  SKIN: warm, dry, no rash. GEN: Patient awake alert NAD.   HEENT: normocephalic, atraumatic, EOMI, no scleral icterus, moist MM  CARDIAC: RRR, S1, S2, no murmur.   PULM: CTA B/L no wheeze, rhonchi, rales.   ABD: soft NT, ND, no rebound no guarding, no CVA tenderness.   MSK: Moving all extremities, no edema. 5/5 strength and full ROM in all extremities.     NEURO: A&Ox3, gait normal, no focal neurological deficits, CN 2-12 grossly intact  SKIN: warm, dry, no rash.  affect: mildly anxious

## 2024-01-10 NOTE — H&P ADULT - NSHPREVIEWOFSYSTEMS_GEN_ALL_CORE
ROS:  General:  No fevers, chills, or unexplained weight loss  Skin: No rash or bothersome skin lesions  Musculoskeletal: No arthalgias, myalgias or joint swelling  Eyes: No visual changes or eye pain  Ears: No hearing loss , otorrhea or ear pain  Nose, Mouth, Throat: No nasal congestion, rhinorrhea, oral lesions, postnasal drip or sore throat  Cardio: No chest pain or palpitations. no lower extremity edema. no syncope. no claudication.   Respiratory: No cough, shortness of breath or wheezing   GI: see hpi.    : No urinary frequency, hematuria, incontinence, or dysuria  Neurologic: No headaches, parasthesias, confusion, dysarthria or gait instability  Psychiatric:  No anxiety or depression  Lymphatic:  No easy bruising, easy bleeding or swollen glands  Allergic: No itching, sneezing , watery eyes, clear rhinorrhea or recurrent infections

## 2024-01-10 NOTE — CONSULT NOTE ADULT - ASSESSMENT
49 yo woman with hx of total Thyroidectomy and hx of Hypocalcemia following surgery on Calcium and Vit D supp without dose change for 25 years.  CLAUDIA due to hypercalcemia likely.  IV NS for Calcium excretion.  Check Vit D and PTH to further assess.  Follow up renal sonogram with hx of ureteral surgery.

## 2024-01-10 NOTE — ED ADULT NURSE REASSESSMENT NOTE - NS ED NURSE REASSESS COMMENT FT1
received handoff report from Aide [facility]. safety precautions and comfort measures maintained. patient resting comfortably in bed. respirations even and unlabored. cardiac monitor placed, EKG completed. pending bed placement.

## 2024-01-10 NOTE — ED PROVIDER NOTE - NSICDXPASTSURGICALHX_GEN_ALL_CORE_FT
Patient presenting complaining of abdominal pains beginning Friday.  Generalized.  Decreased appetite associated, but when she forces herself to eat no changes in pain.  Associated belching/nausea, still passing gas from below.  Denying radiation of pain into chest.  Feeling some associated lightheadedness which she attributes to minimal PO.    A 14 point review of systems is negative except as in HPI or otherwise documented.    Exam:  General: Patient well appearing but uncomfortable, vital signs within normal limits  HEENT: airway patent with moist mucous membranes  Cardiac: RRR S1/S2 with strong peripheral pulses  Respiratory: lungs clear without respiratory distress  GI: abdomen soft, diffusely tender with involuntary guarding, non distended  Neuro: no gross neurologic deficits  Skin: warm, well perfused  Psych: normal mood and affect    Differential includes aortic disease (history of AAA), perforation, obstruction - plan for labs, pain control, CTA Abdomen and pelvis, possible surgical consultation, anticipate admission.
PAST SURGICAL HISTORY:  No significant past surgical history

## 2024-01-10 NOTE — H&P ADULT - NSHPPHYSICALEXAM_GEN_ALL_CORE
PEx  T(C): 37 (01-10-24 @ 06:27), Max: 37 (01-10-24 @ 06:27)  HR: 79 (01-10-24 @ 06:27) (78 - 91)  BP: 137/72 (01-10-24 @ 06:27) (132/62 - 150/70)  RR: 18 (01-10-24 @ 06:27) (16 - 18)  SpO2: 98% (01-10-24 @ 06:27) (95% - 98%)  Wt(kg): --  General:  NAD.  lying in stretcher.    Skin: no rash or prominent lesions  Head: normocephalic, atraumatic     Sinuses: non-tender  Nose: no external lesions, mucosa non-inflamed, septum and turbinates normal  Throat: no erythema, exudates or lesions.  Neck: Supple without lymphadenopathy. Thyroid no thyromegaly, no palpable thyroid nodules, no palpable nodules or masses, carotid arteries no bruits.   Breasts: No palpable masses or lesions.  Heart: RRR, no murmur or gallop.  Normal S1, S2.  No S3, S4.   Lungs: CTA bilaterally, no wheezes, rhonchi, rales.  Breathing unlabored.   Chest wall: Normal insp   Abdomen:  Soft, NT/ND, normal bowel sounds, no HSM, no masses.  No peritoneal signs.   Back: spine normal without deformity or tenderness.  Normal ROM   : Exam normal.  no inguinal hernias.  Extremities: No deformities, clubbing, cyanosis, or edema.  Musculoskeletal: Normal gait and station. No decreased range of motion, instability, atrophy or abnormal strength or tone in the head, neck, spine, ribs, pelvis or extremities.   Neurologic: CN 2-12 normal. Sensation to pain, touch and proprioception normal. DTRs normal in upper and lower extremities. No pathologic reflexes.  Motor normal.  Psychiatric: Oriented X3, intact recent and remote memory, judgement and insight, normal mood and affect.
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
## 2024-01-10 NOTE — H&P ADULT - HISTORY OF PRESENT ILLNESS
CC:  abnormal labs.      HPI:  49 y/o female with PMHX of thyroid cancer s/p total thyroidectomy, hypocalemia, ureterocele and spinal fusion who presented to  after being referred for admission for abnormal labs-- elevated CA and ARF.  Patient notes she has always had low calcium levels ever since her total thyroidectomy.  She follows with endocrine.  She has been taking 2 tablets of calcium (600) and vitamin D (800) since her surgery years ago.  This has kept her Ca around 9 over the years.  In NOV she had labs and her calcium level was ~11.  Her endocrinologist told her to decrease to 1 tab instead of 2.  Over the last month, patient notes she has been having stomach issues-- earlier satiety, decreased appetite, nausea and bloating.  She saw a GI doc who ordered labs and found her CA was 13 and Cr was 2.5.  She was referred to the ER for evaluation.      In the ER, repeat labs about the same.  Ca 13.5.  Cr 2.6.  Patient given 2 L NSS and admitted.        PAST MEDICAL & SURGICAL HISTORY:  as above      FAMILY HISTORY:  Patient denies family hx of high calcium.        Social History:    No smoking.    Social ETOH.    No drug.        Allergies:  Reglan (Unknown)  shellfish (Unknown)  shellfish (Rash)     CC:  abnormal labs.      HPI:  47 y/o female with PMHX of thyroid cancer s/p total thyroidectomy, hypocalemia, ureterocele and spinal fusion who presented to  after being referred for admission for abnormal labs-- elevated CA and ARF.  Patient notes she has always had low calcium levels ever since her total thyroidectomy.  She follows with endocrine.  She has been taking 2 tablets of calcium (600) and vitamin D (800) since her surgery years ago.  This has kept her Ca around 9 over the years.  In NOV she had labs and her calcium level was ~11.  Her endocrinologist told her to decrease to 1 tab instead of 2.  Over the last month, patient notes she has been having stomach issues-- earlier satiety, decreased appetite, nausea and bloating.  She saw a GI doc who ordered labs and found her CA was 13 and Cr was 2.5.  She was referred to the ER for evaluation.      In the ER, repeat labs about the same.  Ca 13.5.  Cr 2.6.  Patient given 2 L NSS and admitted.        PAST MEDICAL & SURGICAL HISTORY:  as above      FAMILY HISTORY:  Patient denies family hx of high calcium.        Social History:    No smoking.    Social ETOH.    No drug.        Allergies:  Reglan (Unknown)  shellfish (Unknown)  shellfish (Rash)

## 2024-01-10 NOTE — ED PROVIDER NOTE - CLINICAL SUMMARY MEDICAL DECISION MAKING FREE TEXT BOX
Pt at baseline mental status with moderate hypercalcemia. Plan to IV hydrate, and admit on telemetry for monitoring and nephrology and Endo to see.

## 2024-01-10 NOTE — H&P ADULT - ASSESSMENT
47 y/o female with PMHX of thyroid cancer s/p total thyroidectomy, hypocalcemia, ureterocele and spinal fusion who presented to  after being referred for admission for abnormal labs-- elevated CA and ARF.      #Hypercalcemia with CLAUDIA:    Admit to tele.    S/p 2 L NSS in ER.    Cont NSS @ 150/hr.    Check PTH, ionized CA, PTHrP, TSH, vitamin D, LDH, SPEP.    Differential includes:  exogenous from supplements/ hyperparathyroid/ malignancy.    No hx of elevated CA in the past-- was always low on supplements.    Check renal/ bladder US.    Renal consult.       47 y/o female with PMHX of thyroid cancer s/p total thyroidectomy, hypocalcemia, ureterocele and spinal fusion who presented to  after being referred for admission for abnormal labs-- elevated CA and ARF.      #Hypercalcemia with CLAUDIA:    Admit to tele.    S/p 2 L NSS in ER.    Cont NSS @ 150/hr.    Check PTH, ionized CA, PTHrP, TSH, vitamin D, LDH, SPEP, immunofixation.    Check urine lytes.    Differential includes:  exogenous from supplements/ hyperparathyroid/ malignancy.    No hx of elevated CA in the past-- was always low on supplements.    Check renal/ bladder US.    Renal consult.      #Early satiety/ nausea/ bloating:    Suspect all related to symptomatic hypercalcemia.    Patient did have outpatient US abd.    F/u renal/ bladder US.    T/c further imaging if sx persist.      #Thyroid Cancer s/p total thyroidectomy:    Check TSH.    Cont synthroid.      #Depression:    Cont zoloft.      #Spinal fusion sept 2023:  stable.      #DVT Proph:  heparin SQ.   49 y/o female with PMHX of thyroid cancer s/p total thyroidectomy, hypocalcemia, ureterocele and spinal fusion who presented to  after being referred for admission for abnormal labs-- elevated CA and ARF.      #Hypercalcemia with CLAUDIA:    Admit to tele.    S/p 2 L NSS in ER.    Cont NSS @ 150/hr.    Check PTH, ionized CA, PTHrP, TSH, vitamin D, LDH, SPEP, immunofixation.    Check urine lytes.    Differential includes:  exogenous from supplements/ hyperparathyroid/ malignancy.    No hx of elevated CA in the past-- was always low on supplements.    Check renal/ bladder US.    Renal consult.      #Early satiety/ nausea/ bloating:    Suspect all related to symptomatic hypercalcemia.    Patient did have outpatient US abd.    F/u renal/ bladder US.    T/c further imaging if sx persist.      #Thyroid Cancer s/p total thyroidectomy:    Check TSH.    Cont synthroid.      #Depression:    Cont zoloft.      #Spinal fusion sept 2023:  stable.      #DVT Proph:  heparin SQ.

## 2024-01-10 NOTE — H&P ADULT - NSHPLABSRESULTS_GEN_ALL_CORE
12.5   14.04 )-----------( 311      ( 09 Jan 2024 23:22 )             37.3     01-10    142  |  108  |  28<H>  ----------------------------<  153<H>  3.4<L>   |  29  |  2.30<H>    Ca    11.6<H>      10 Jace 2024 09:39  Mg     1.8     01-09    TPro  8.2  /  Alb  4.0  /  TBili  0.7  /  DBili  x   /  AST  27  /  ALT  39  /  AlkPhos  118  01-10    CAPILLARY BLOOD GLUCOSE      Urinalysis Basic - ( 10 Jace 2024 09:39 )  Color: x / Appearance: x / SG: x / pH: x  Gluc: 153 mg/dL / Ketone: x  / Bili: x / Urobili: x   Blood: x / Protein: x / Nitrite: x   Leuk Esterase: x / RBC: x / WBC x   Sq Epi: x / Non Sq Epi: x / Bacteria: x

## 2024-01-11 LAB
ANION GAP SERPL CALC-SCNC: 4 MMOL/L — LOW (ref 5–17)
ANION GAP SERPL CALC-SCNC: 4 MMOL/L — LOW (ref 5–17)
BUN SERPL-MCNC: 24 MG/DL — HIGH (ref 7–23)
BUN SERPL-MCNC: 24 MG/DL — HIGH (ref 7–23)
CALCIUM SERPL-MCNC: 10.8 MG/DL — HIGH (ref 8.5–10.1)
CALCIUM SERPL-MCNC: 10.8 MG/DL — HIGH (ref 8.5–10.1)
CHLORIDE SERPL-SCNC: 114 MMOL/L — HIGH (ref 96–108)
CHLORIDE SERPL-SCNC: 114 MMOL/L — HIGH (ref 96–108)
CO2 SERPL-SCNC: 25 MMOL/L — SIGNIFICANT CHANGE UP (ref 22–31)
CO2 SERPL-SCNC: 25 MMOL/L — SIGNIFICANT CHANGE UP (ref 22–31)
CREAT SERPL-MCNC: 1.91 MG/DL — HIGH (ref 0.5–1.3)
CREAT SERPL-MCNC: 1.91 MG/DL — HIGH (ref 0.5–1.3)
CULTURE RESULTS: SIGNIFICANT CHANGE UP
CULTURE RESULTS: SIGNIFICANT CHANGE UP
EGFR: 32 ML/MIN/1.73M2 — LOW
EGFR: 32 ML/MIN/1.73M2 — LOW
GLUCOSE SERPL-MCNC: 105 MG/DL — HIGH (ref 70–99)
GLUCOSE SERPL-MCNC: 105 MG/DL — HIGH (ref 70–99)
HCT VFR BLD CALC: 32.3 % — LOW (ref 34.5–45)
HCT VFR BLD CALC: 32.3 % — LOW (ref 34.5–45)
HGB BLD-MCNC: 10.7 G/DL — LOW (ref 11.5–15.5)
HGB BLD-MCNC: 10.7 G/DL — LOW (ref 11.5–15.5)
MCHC RBC-ENTMCNC: 27.5 PG — SIGNIFICANT CHANGE UP (ref 27–34)
MCHC RBC-ENTMCNC: 27.5 PG — SIGNIFICANT CHANGE UP (ref 27–34)
MCHC RBC-ENTMCNC: 33.1 GM/DL — SIGNIFICANT CHANGE UP (ref 32–36)
MCHC RBC-ENTMCNC: 33.1 GM/DL — SIGNIFICANT CHANGE UP (ref 32–36)
MCV RBC AUTO: 83 FL — SIGNIFICANT CHANGE UP (ref 80–100)
MCV RBC AUTO: 83 FL — SIGNIFICANT CHANGE UP (ref 80–100)
PLATELET # BLD AUTO: 256 K/UL — SIGNIFICANT CHANGE UP (ref 150–400)
PLATELET # BLD AUTO: 256 K/UL — SIGNIFICANT CHANGE UP (ref 150–400)
POTASSIUM SERPL-MCNC: 4.2 MMOL/L — SIGNIFICANT CHANGE UP (ref 3.5–5.3)
POTASSIUM SERPL-MCNC: 4.2 MMOL/L — SIGNIFICANT CHANGE UP (ref 3.5–5.3)
POTASSIUM SERPL-SCNC: 4.2 MMOL/L — SIGNIFICANT CHANGE UP (ref 3.5–5.3)
POTASSIUM SERPL-SCNC: 4.2 MMOL/L — SIGNIFICANT CHANGE UP (ref 3.5–5.3)
RBC # BLD: 3.89 M/UL — SIGNIFICANT CHANGE UP (ref 3.8–5.2)
RBC # BLD: 3.89 M/UL — SIGNIFICANT CHANGE UP (ref 3.8–5.2)
RBC # FLD: 14.2 % — SIGNIFICANT CHANGE UP (ref 10.3–14.5)
RBC # FLD: 14.2 % — SIGNIFICANT CHANGE UP (ref 10.3–14.5)
SODIUM SERPL-SCNC: 143 MMOL/L — SIGNIFICANT CHANGE UP (ref 135–145)
SODIUM SERPL-SCNC: 143 MMOL/L — SIGNIFICANT CHANGE UP (ref 135–145)
SPECIMEN SOURCE: SIGNIFICANT CHANGE UP
SPECIMEN SOURCE: SIGNIFICANT CHANGE UP
WBC # BLD: 9.12 K/UL — SIGNIFICANT CHANGE UP (ref 3.8–10.5)
WBC # BLD: 9.12 K/UL — SIGNIFICANT CHANGE UP (ref 3.8–10.5)
WBC # FLD AUTO: 9.12 K/UL — SIGNIFICANT CHANGE UP (ref 3.8–10.5)
WBC # FLD AUTO: 9.12 K/UL — SIGNIFICANT CHANGE UP (ref 3.8–10.5)

## 2024-01-11 PROCEDURE — 99233 SBSQ HOSP IP/OBS HIGH 50: CPT

## 2024-01-11 PROCEDURE — 99232 SBSQ HOSP IP/OBS MODERATE 35: CPT

## 2024-01-11 RX ORDER — FAMOTIDINE 10 MG/ML
20 INJECTION INTRAVENOUS AT BEDTIME
Refills: 0 | Status: DISCONTINUED | OUTPATIENT
Start: 2024-01-11 | End: 2024-01-11

## 2024-01-11 RX ORDER — FAMOTIDINE 10 MG/ML
20 INJECTION INTRAVENOUS AT BEDTIME
Refills: 0 | Status: DISCONTINUED | OUTPATIENT
Start: 2024-01-11 | End: 2024-01-12

## 2024-01-11 RX ADMIN — SERTRALINE 200 MILLIGRAM(S): 25 TABLET, FILM COATED ORAL at 10:01

## 2024-01-11 RX ADMIN — Medication 100 MILLIGRAM(S): at 21:43

## 2024-01-11 RX ADMIN — SODIUM CHLORIDE 150 MILLILITER(S): 9 INJECTION INTRAMUSCULAR; INTRAVENOUS; SUBCUTANEOUS at 21:44

## 2024-01-11 RX ADMIN — SODIUM CHLORIDE 150 MILLILITER(S): 9 INJECTION INTRAMUSCULAR; INTRAVENOUS; SUBCUTANEOUS at 06:22

## 2024-01-11 RX ADMIN — Medication 175 MICROGRAM(S): at 06:23

## 2024-01-11 RX ADMIN — PANTOPRAZOLE SODIUM 40 MILLIGRAM(S): 20 TABLET, DELAYED RELEASE ORAL at 06:23

## 2024-01-11 RX ADMIN — FAMOTIDINE 20 MILLIGRAM(S): 10 INJECTION INTRAVENOUS at 21:43

## 2024-01-11 NOTE — PROGRESS NOTE ADULT - SUBJECTIVE AND OBJECTIVE BOX
NEPHROLOGY INTERVAL HPI/OVERNIGHT EVENTS:    Date of Service: 01-11-24 @ 11:44    1/11--Feeling better.  Abd discomfort improved and able to eat better.      HPI:  49 yo woman with pmhx of Thyroid CA post total thyroidectomy, hx of Hypocalcemia on Calcium and Rocaltrol 1.5 mcg daily since thyroidectomy.  Had been on same Calcium and Vit D supp for last 25 years with Calcium level WNL.  In 11/2023, noted for Caldium level of >11, Calcium supp was decreased to daily dosing.  Developed abdominal discomfort with bloating and decreased appetite and occ nausea leading to GI evaluation with Dr Fierro.  Lab done revealed Clacium of 12.5 and creat of 2.5 with no previous hx of CKD and sent to ED.  In ED, Calcium of 13.5 and Creat of 2.6.  Denies any excessive mild consumption or any new OTC meds.  No NSAIDS.    PMHX and PSHX.  --hx of Thyroid CA --total thyroidectomy  without known reimplant of parathyroid gland--25 years ago.  --Hypocalcemia since thyroidectomy  --Hx of Ureterocele repair as a child  --Post recent spinal surgery.  --Hx of Gastric sleeve 2014.    Home Medications:   * Patient Currently Takes Medications as of 10-Jace-2024 08:56 documented in Structured Notes  · 	levothyroxine 175 mcg (0.175 mg) oral tablet: Last Dose Taken:  , 1 tab(s) orally 6 times a week Mon-Sat  · 	famotidine 40 mg oral tablet: Last Dose Taken:  , 1 tab(s) orally once a day (at bedtime)  · 	levothyroxine 175 mcg (0.175 mg) oral tablet: Last Dose Taken:  , 0.5 tab(s) orally once a week on Sunday  · 	omeprazole 40 mg oral delayed release capsule: Last Dose Taken:  , 1 cap(s) orally once a day  · 	traZODone 100 mg oral tablet: Last Dose Taken:  , 1 tab(s) orally once a day (at bedtime)  · 	sertraline 100 mg oral tablet: Last Dose Taken:  , 2 tab(s) orally once a day (in the morning)  · 	calcitriol 0.5 mcg oral capsule: Last Dose Taken:  , 1 cap(s) orally 3 times a day  · 	rosuvastatin 20 mg oral tablet: Last Dose Taken:  , 1 tab(s) orally once a day    MEDICATIONS  (STANDING):  atorvastatin 80 milliGRAM(s) Oral at bedtime  famotidine    Tablet 40 milliGRAM(s) Oral every 48 hours  heparin   Injectable 5000 Unit(s) SubCutaneous every 12 hours  levothyroxine 175 MICROGram(s) Oral <User Schedule>  levothyroxine 87.5 MICROGram(s) Oral <User Schedule>  pantoprazole    Tablet 40 milliGRAM(s) Oral before breakfast  sertraline 200 milliGRAM(s) Oral daily  sodium chloride 0.9%. 1000 milliLiter(s) (150 mL/Hr) IV Continuous <Continuous>  traZODone 100 milliGRAM(s) Oral at bedtime    MEDICATIONS  (PRN):  acetaminophen     Tablet .. 650 milliGRAM(s) Oral every 6 hours PRN Temp greater or equal to 38C (100.4F), Mild Pain (1 - 3)  aluminum hydroxide/magnesium hydroxide/simethicone Suspension 30 milliLiter(s) Oral every 4 hours PRN Dyspepsia  melatonin 3 milliGRAM(s) Oral at bedtime PRN Insomnia  ondansetron Injectable 4 milliGRAM(s) IV Push every 8 hours PRN Nausea and/or Vomiting    Vital Signs Last 24 Hrs  T(C): 36.5 (11 Jan 2024 08:49), Max: 37 (10 Jace 2024 14:30)  T(F): 97.7 (11 Jan 2024 08:49), Max: 98.6 (10 Jace 2024 14:30)  HR: 68 (11 Jan 2024 08:49) (68 - 80)  BP: 135/56 (11 Jan 2024 08:49) (122/70 - 136/70)  BP(mean): --  RR: 18 (11 Jan 2024 08:49) (18 - 18)  SpO2: 97% (11 Jan 2024 08:49) (97% - 99%)    Parameters below as of 11 Jan 2024 08:49  Patient On (Oxygen Delivery Method): room air    PHYSICAL EXAM:  GENERAL: no distress  CHEST/LUNG: clear  to aus  HEART: S1S2 RRR  ABDOMEN: soft  EXTREMITIES: no edema  SKIN:     LABS:                        10.7   9.12  )-----------( 256      ( 11 Jan 2024 08:14 )             32.3     01-11    143  |  114<H>  |  24<H>  ----------------------------<  105<H>  4.2   |  25  |  1.91<H>    Ca    10.8<H>      11 Jan 2024 08:14  Mg     1.8     01-09    TPro  7.3  /  Alb  4.0  /  TBili  0.7  /  DBili  x   /  AST  27  /  ALT  39  /  AlkPhos  118  01-10      Urinalysis Basic - ( 11 Jan 2024 08:14 )    Color: x / Appearance: x / SG: x / pH: x  Gluc: 105 mg/dL / Ketone: x  / Bili: x / Urobili: x   Blood: x / Protein: x / Nitrite: x   Leuk Esterase: x / RBC: x / WBC x   Sq Epi: x / Non Sq Epi: x / Bacteria: x              RADIOLOGY & ADDITIONAL TESTS:

## 2024-01-11 NOTE — PROGRESS NOTE ADULT - TIME BILLING
Time spent  coordinating the patient's care. This includes reviewing documentation pertinent to this admission, results and imaging. Further tests, medications, and procedures have been ordered as indicated. Laboratory results and the plan of care were communicated to the patient. Discussed care plan with consultants including renal. Supporting documentation was completed and added to the patient's chart.

## 2024-01-11 NOTE — PROGRESS NOTE ADULT - SUBJECTIVE AND OBJECTIVE BOX
HOSPITALIST ATTENDING PROGRESS NOTE    Chart and meds reviewed.  Patient seen and examined.    CC: hyperCa    Subjective: Pt feeling improved, abdnl pain/bloating largely resolved.    All other systems reviewed and found to be negative with the exception of what has been described above.    MEDICATIONS  (STANDING):  atorvastatin 80 milliGRAM(s) Oral at bedtime  famotidine    Tablet 40 milliGRAM(s) Oral every 48 hours  heparin   Injectable 5000 Unit(s) SubCutaneous every 12 hours  levothyroxine 175 MICROGram(s) Oral <User Schedule>  levothyroxine 87.5 MICROGram(s) Oral <User Schedule>  pantoprazole    Tablet 40 milliGRAM(s) Oral before breakfast  sertraline 200 milliGRAM(s) Oral daily  sodium chloride 0.9%. 1000 milliLiter(s) (150 mL/Hr) IV Continuous <Continuous>  traZODone 100 milliGRAM(s) Oral at bedtime    MEDICATIONS  (PRN):  acetaminophen     Tablet .. 650 milliGRAM(s) Oral every 6 hours PRN Temp greater or equal to 38C (100.4F), Mild Pain (1 - 3)  aluminum hydroxide/magnesium hydroxide/simethicone Suspension 30 milliLiter(s) Oral every 4 hours PRN Dyspepsia  melatonin 3 milliGRAM(s) Oral at bedtime PRN Insomnia  ondansetron Injectable 4 milliGRAM(s) IV Push every 8 hours PRN Nausea and/or Vomiting      VITALS:  T(F): 97.8 (01-11-24 @ 16:32), Max: 97.8 (01-11-24 @ 16:32)  HR: 79 (01-11-24 @ 16:32) (68 - 79)  BP: 125/51 (01-11-24 @ 16:32) (125/51 - 135/56)  RR: 18 (01-11-24 @ 16:32) (18 - 18)  SpO2: 99% (01-11-24 @ 16:32) (97% - 99%)  Wt(kg): --    I&O's Summary    11 Jan 2024 07:01  -  11 Jan 2024 19:43  --------------------------------------------------------  IN: 1650 mL / OUT: 0 mL / NET: 1650 mL        CAPILLARY BLOOD GLUCOSE          PHYSICAL EXAM:  Gen: NAD  HEENT:  pupils equal and reactive, EOMI, no oropharyngeal lesions, erythema, exudates, oral thrush  NECK:   supple, no carotid bruits, no palpable lymph nodes, no thyromegaly  CV:  +S1, +S2, regular, no murmurs or rubs  RESP:   lungs clear to auscultation bilaterally, no wheezing, rales, rhonchi, good air entry bilaterally  BREAST:  not examined  GI:  abdomen soft, non-tender, non-distended, normal BS, no bruits, no abdominal masses, no palpable masses  RECTAL:  not examined  :  not examined  MSK:   normal muscle tone, no atrophy, no rigidity, no contractions  EXT:  no clubbing, no cyanosis, no edema, no calf pain, swelling or erythema  VASCULAR:  pulses equal and symmetric in the upper and lower extremities  NEURO:  AAOX3, no focal neurological deficits, follows all commands, able to move extremities spontaneously  SKIN:  no ulcers, lesions or rashes    LABS:                            10.7   9.12  )-----------( 256      ( 11 Jan 2024 08:14 )             32.3     01-11    143  |  114<H>  |  24<H>  ----------------------------<  105<H>  4.2   |  25  |  1.91<H>    Ca    10.8<H>      11 Jan 2024 08:14  Mg     1.8     01-09    TPro  7.3  /  Alb  4.0  /  TBili  0.7  /  DBili  x   /  AST  27  /  ALT  39  /  AlkPhos  118  01-10        LIVER FUNCTIONS - ( 10 Jace 2024 09:39 )  Alb: 4.0 g/dL / Pro: 7.3 g/dL / ALK PHOS: 118 U/L / ALT: 39 U/L / AST: 27 U/L / GGT: x             Urinalysis Basic - ( 11 Jan 2024 08:14 )    Color: x / Appearance: x / SG: x / pH: x  Gluc: 105 mg/dL / Ketone: x  / Bili: x / Urobili: x   Blood: x / Protein: x / Nitrite: x   Leuk Esterase: x / RBC: x / WBC x   Sq Epi: x / Non Sq Epi: x / Bacteria: x    CULTURES:  no new    Additional results/Imaging, I have personally reviewed:  CXR 1/10/24: neg    Renal bladder u/s 1/10/24: No evidence for bilateral hydronephrosis. Left renal atrophy.    Telemetry, personally reviewed:  1/11/24: sinus 50-90, d/c tele

## 2024-01-11 NOTE — PROGRESS NOTE ADULT - ASSESSMENT
47 y/o female with PMHX of thyroid cancer s/p total thyroidectomy, hypocalcemia, ureterocele and spinal fusion who presented to  after being referred for admission for abnormal labs-- elevated CA and ARF.      #Hypercalcemia with CLAUDIA  -CLAUDIA 2/2 hyperCa  -improving w IVF  -clarify w renal re: likely cause of hyperCa  -f/u renal recs    #abdnl pain/bloating  -2/2 HyperCa  -resolving     #DVT ppx- SQH    Poss d/c 1/12 if continued improvement in Ca and Cr 49 y/o female with PMHX of thyroid cancer s/p total thyroidectomy, hypocalcemia, ureterocele and spinal fusion who presented to  after being referred for admission for abnormal labs-- elevated CA and ARF.      #Hypercalcemia with CLAUDIA  -CLAUDIA 2/2 hyperCa  -improving w IVF  -clarify w renal re: likely cause of hyperCa  -f/u renal recs    #abdnl pain/bloating  -2/2 HyperCa  -resolving     #DVT ppx- SQH    Poss d/c 1/12 if continued improvement in Ca and Cr

## 2024-01-11 NOTE — PROGRESS NOTE ADULT - ASSESSMENT
47 yo woman with hx of total Thyroidectomy and hx of Hypocalcemia following surgery on Calcium and Vit D supp without dose change for 25 years.  CLAUDIA due to hypercalcemia likely.  IV NS for Calcium excretion.  Check Vit D and PTH to further assess.  Follow up renal sonogram with hx of ureteral surgery.    1/11 SY  --CLAUDIA due to volume depletion with Hypercalcemia : continue IV NS for Calcium excretion.  --Hypercalcemia : unclear etiology and unlikely due to calcium and vit D supp.    Of concern is decreased HGB with hydration : continue to monitor --follow up work up for Paraproteins.    check Urine prot/creat ratio.  --Renal USG with atrophic Left kidney --c/w with hx of Left ureteral surgery.

## 2024-01-12 ENCOUNTER — TRANSCRIPTION ENCOUNTER (OUTPATIENT)
Age: 49
End: 2024-01-12

## 2024-01-12 VITALS
HEART RATE: 72 BPM | RESPIRATION RATE: 20 BRPM | DIASTOLIC BLOOD PRESSURE: 83 MMHG | SYSTOLIC BLOOD PRESSURE: 136 MMHG | OXYGEN SATURATION: 95 %

## 2024-01-12 DIAGNOSIS — Z98.1 ARTHRODESIS STATUS: Chronic | ICD-10-CM

## 2024-01-12 DIAGNOSIS — E89.0 POSTPROCEDURAL HYPOTHYROIDISM: Chronic | ICD-10-CM

## 2024-01-12 LAB
ALBUMIN SERPL ELPH-MCNC: 3.1 G/DL — LOW (ref 3.3–5)
ALBUMIN SERPL ELPH-MCNC: 3.1 G/DL — LOW (ref 3.3–5)
ANION GAP SERPL CALC-SCNC: 4 MMOL/L — LOW (ref 5–17)
ANION GAP SERPL CALC-SCNC: 4 MMOL/L — LOW (ref 5–17)
BUN SERPL-MCNC: 22 MG/DL — SIGNIFICANT CHANGE UP (ref 7–23)
BUN SERPL-MCNC: 22 MG/DL — SIGNIFICANT CHANGE UP (ref 7–23)
CALCIUM SERPL-MCNC: 10.2 MG/DL — HIGH (ref 8.5–10.1)
CALCIUM SERPL-MCNC: 10.2 MG/DL — HIGH (ref 8.5–10.1)
CHLORIDE SERPL-SCNC: 113 MMOL/L — HIGH (ref 96–108)
CHLORIDE SERPL-SCNC: 113 MMOL/L — HIGH (ref 96–108)
CO2 SERPL-SCNC: 24 MMOL/L — SIGNIFICANT CHANGE UP (ref 22–31)
CO2 SERPL-SCNC: 24 MMOL/L — SIGNIFICANT CHANGE UP (ref 22–31)
CREAT SERPL-MCNC: 1.79 MG/DL — HIGH (ref 0.5–1.3)
CREAT SERPL-MCNC: 1.79 MG/DL — HIGH (ref 0.5–1.3)
EGFR: 34 ML/MIN/1.73M2 — LOW
EGFR: 34 ML/MIN/1.73M2 — LOW
GLUCOSE SERPL-MCNC: 105 MG/DL — HIGH (ref 70–99)
GLUCOSE SERPL-MCNC: 105 MG/DL — HIGH (ref 70–99)
HCT VFR BLD CALC: 30.3 % — LOW (ref 34.5–45)
HCT VFR BLD CALC: 30.3 % — LOW (ref 34.5–45)
HGB BLD-MCNC: 10.2 G/DL — LOW (ref 11.5–15.5)
HGB BLD-MCNC: 10.2 G/DL — LOW (ref 11.5–15.5)
MCHC RBC-ENTMCNC: 28.2 PG — SIGNIFICANT CHANGE UP (ref 27–34)
MCHC RBC-ENTMCNC: 28.2 PG — SIGNIFICANT CHANGE UP (ref 27–34)
MCHC RBC-ENTMCNC: 33.7 GM/DL — SIGNIFICANT CHANGE UP (ref 32–36)
MCHC RBC-ENTMCNC: 33.7 GM/DL — SIGNIFICANT CHANGE UP (ref 32–36)
MCV RBC AUTO: 83.7 FL — SIGNIFICANT CHANGE UP (ref 80–100)
MCV RBC AUTO: 83.7 FL — SIGNIFICANT CHANGE UP (ref 80–100)
PHOSPHATE SERPL-MCNC: 3.5 MG/DL — SIGNIFICANT CHANGE UP (ref 2.5–4.5)
PHOSPHATE SERPL-MCNC: 3.5 MG/DL — SIGNIFICANT CHANGE UP (ref 2.5–4.5)
PLATELET # BLD AUTO: 202 K/UL — SIGNIFICANT CHANGE UP (ref 150–400)
PLATELET # BLD AUTO: 202 K/UL — SIGNIFICANT CHANGE UP (ref 150–400)
POTASSIUM SERPL-MCNC: 4.4 MMOL/L — SIGNIFICANT CHANGE UP (ref 3.5–5.3)
POTASSIUM SERPL-MCNC: 4.4 MMOL/L — SIGNIFICANT CHANGE UP (ref 3.5–5.3)
POTASSIUM SERPL-SCNC: 4.4 MMOL/L — SIGNIFICANT CHANGE UP (ref 3.5–5.3)
POTASSIUM SERPL-SCNC: 4.4 MMOL/L — SIGNIFICANT CHANGE UP (ref 3.5–5.3)
RBC # BLD: 3.62 M/UL — LOW (ref 3.8–5.2)
RBC # BLD: 3.62 M/UL — LOW (ref 3.8–5.2)
RBC # FLD: 14.1 % — SIGNIFICANT CHANGE UP (ref 10.3–14.5)
RBC # FLD: 14.1 % — SIGNIFICANT CHANGE UP (ref 10.3–14.5)
SODIUM SERPL-SCNC: 141 MMOL/L — SIGNIFICANT CHANGE UP (ref 135–145)
SODIUM SERPL-SCNC: 141 MMOL/L — SIGNIFICANT CHANGE UP (ref 135–145)
WBC # BLD: 9.35 K/UL — SIGNIFICANT CHANGE UP (ref 3.8–10.5)
WBC # BLD: 9.35 K/UL — SIGNIFICANT CHANGE UP (ref 3.8–10.5)
WBC # FLD AUTO: 9.35 K/UL — SIGNIFICANT CHANGE UP (ref 3.8–10.5)
WBC # FLD AUTO: 9.35 K/UL — SIGNIFICANT CHANGE UP (ref 3.8–10.5)

## 2024-01-12 PROCEDURE — 99232 SBSQ HOSP IP/OBS MODERATE 35: CPT

## 2024-01-12 PROCEDURE — 99239 HOSP IP/OBS DSCHRG MGMT >30: CPT

## 2024-01-12 RX ORDER — CALCITRIOL 0.5 UG/1
1 CAPSULE ORAL
Refills: 0 | DISCHARGE

## 2024-01-12 RX ADMIN — SERTRALINE 200 MILLIGRAM(S): 25 TABLET, FILM COATED ORAL at 10:20

## 2024-01-12 RX ADMIN — SODIUM CHLORIDE 150 MILLILITER(S): 9 INJECTION INTRAMUSCULAR; INTRAVENOUS; SUBCUTANEOUS at 05:08

## 2024-01-12 RX ADMIN — Medication 175 MICROGRAM(S): at 05:08

## 2024-01-12 RX ADMIN — PANTOPRAZOLE SODIUM 40 MILLIGRAM(S): 20 TABLET, DELAYED RELEASE ORAL at 05:08

## 2024-01-12 RX ADMIN — SODIUM CHLORIDE 150 MILLILITER(S): 9 INJECTION INTRAMUSCULAR; INTRAVENOUS; SUBCUTANEOUS at 10:37

## 2024-01-12 NOTE — DISCHARGE NOTE NURSING/CASE MANAGEMENT/SOCIAL WORK - NSDCPEFALRISK_GEN_ALL_CORE
For information on Fall & Injury Prevention, visit: [email or web address] OR  [email or web address] OR  https://www.cdc.gov/steadi/patient.html For information on Fall & Injury Prevention, visit: [email or web address] OR  [email or web address] OR  https://www.cdc.gov/steadi/patient.html

## 2024-01-12 NOTE — DISCHARGE NOTE PROVIDER - NSDCCPCAREPLAN_GEN_ALL_CORE_FT
PRINCIPAL DISCHARGE DIAGNOSIS  Diagnosis: Hypercalcemia  Assessment and Plan of Treatment: Do not take supplemental calcium or calcitriol for now. Get outpatient labs at a [facility] on Tues 1/16 and follow up with Dr. Gibbs in the office on Fri 1/19.      SECONDARY DISCHARGE DIAGNOSES  Diagnosis: CLAUDIA (acute kidney injury)  Assessment and Plan of Treatment: In setting of hypercalcemia, stay well hydrated, drink 64 oz of water per day.     PRINCIPAL DISCHARGE DIAGNOSIS  Diagnosis: Hypercalcemia  Assessment and Plan of Treatment: Do not take supplemental calcium or calcitriol for now. Get outpatient labs at a [facility] on Tues 1/16 and follow up with Dr. Gibbs in the office on Fri 1/19.      SECONDARY DISCHARGE DIAGNOSES  Diagnosis: CLAUDIA (acute kidney injury)  Assessment and Plan of Treatment: In setting of hypercalcemia, stay well hydrated, drink 64 oz of water per day.

## 2024-01-12 NOTE — DISCHARGE NOTE PROVIDER - PROVIDER TOKENS
PROVIDER:[TOKEN:[[id number]],FOLLOWUP:[1 week]],PROVIDER:[TOKEN:[[id number]],FOLLOWUP:[2 weeks]]

## 2024-01-12 NOTE — DISCHARGE NOTE NURSING/CASE MANAGEMENT/SOCIAL WORK - PATIENT PORTAL LINK FT
You can access the FollowMyHealth Patient Portal offered by [facility] by registering at the following website: [email or web address]. By joining [employer]’s FollowMyHealth portal, you will also be able to view your health information using other applications (apps) compatible with our system. You can access the FollowMyHealth Patient Portal offered by [facility] by registering at the following website: [email or web address]. By joining [employer]’s FollowMyHealth portal, you will also be able to view your health information using other applications (apps) compatible with our system.

## 2024-01-12 NOTE — DISCHARGE NOTE PROVIDER - NSDCPNSUBOBJ_GEN_ALL_CORE
VITALS:  T(F): 98 (01-12-24 @ 08:51), Max: 98 (01-12-24 @ 08:51)  HR: 72 (01-12-24 @ 11:00) (72 - 83)  BP: 136/83 (01-12-24 @ 11:00) (124/52 - 160/80)  RR: 20 (01-12-24 @ 11:00) (18 - 27)  SpO2: 95% (01-12-24 @ 11:00) (95% - 99%)    PHYSICAL EXAM:  Gen: NAD  HEENT:  pupils equal and reactive, EOMI, no oropharyngeal lesions, erythema, exudates, oral thrush  NECK:   supple, no carotid bruits, no palpable lymph nodes, no thyromegaly  CV:  +S1, +S2, regular, no murmurs or rubs  RESP:   lungs clear to auscultation bilaterally, no wheezing, rales, rhonchi, good air entry bilaterally  BREAST:  not examined  GI:  abdomen soft, non-tender, non-distended, normal BS, no bruits, no abdominal masses, no palpable masses  RECTAL:  not examined  :  not examined  MSK:   normal muscle tone, no atrophy, no rigidity, no contractions  EXT:  no clubbing, no cyanosis, no edema, no calf pain, swelling or erythema  VASCULAR:  pulses equal and symmetric in the upper and lower extremities  NEURO:  AAOX3, no focal neurological deficits, follows all commands, able to move extremities spontaneously  SKIN:  no ulcers, lesions or rashes    #Hypercalcemia with CLAUDIA  -CLAUDIA 2/2 hyperCa  -improving w IVF, stay well hydrated on d/c, 64oz water/day  -no Ca or calcitriol until f/u w renal  -unclear etiology, outpt f/u re: further w/u  -appreciate renal input  -labs on 1/16, f/u w Dr. Gibbs on 1/19    #abdnl pain/bloating  -2/2 HyperCa  -resolving     For close outpt f/u

## 2024-01-12 NOTE — DISCHARGE NOTE PROVIDER - NSDCMRMEDTOKEN_GEN_ALL_CORE_FT
famotidine 40 mg oral tablet: 1 tab(s) orally once a day (at bedtime)  levothyroxine 175 mcg (0.175 mg) oral tablet: 0.5 tab(s) orally once a week on Sunday  levothyroxine 175 mcg (0.175 mg) oral tablet: 1 tab(s) orally 6 times a week Mon-Sat  omeprazole 40 mg oral delayed release capsule: 1 cap(s) orally once a day  rosuvastatin 20 mg oral tablet: 1 tab(s) orally once a day  sertraline 100 mg oral tablet: 2 tab(s) orally once a day (in the morning)  traZODone 100 mg oral tablet: 1 tab(s) orally once a day (at bedtime)

## 2024-01-12 NOTE — PROGRESS NOTE ADULT - SUBJECTIVE AND OBJECTIVE BOX
NEPHROLOGY INTERVAL HPI/OVERNIGHT EVENTS:  01-12-24 @ 12:54    1/12 1/11--Feeling better.  Abd discomfort improved and able to eat better.      HPI:  49 yo woman with pmhx of Thyroid CA post total thyroidectomy, hx of Hypocalcemia on Calcium and Rocaltrol 1.5 mcg daily since thyroidectomy.  Had been on same Calcium and Vit D supp for last 25 years with Calcium level WNL.  In 11/2023, noted for Caldium level of >11, Calcium supp was decreased to daily dosing.  Developed abdominal discomfort with bloating and decreased appetite and occ nausea leading to GI evaluation with Dr Fierro.  Lab done revealed Clacium of 12.5 and creat of 2.5 with no previous hx of CKD and sent to ED.  In ED, Calcium of 13.5 and Creat of 2.6.  Denies any excessive mild consumption or any new OTC meds.  No NSAIDS.    PMHX and PSHX.  --hx of Thyroid CA --total thyroidectomy  without known reimplant of parathyroid gland--25 years ago.  --Hypocalcemia since thyroidectomy  --Hx of Ureterocele repair as a child  --Post recent spinal surgery.  --Hx of Gastric sleeve 2014.      MEDICATIONS  (STANDING):  atorvastatin 80 milliGRAM(s) Oral at bedtime  famotidine    Tablet 20 milliGRAM(s) Oral at bedtime  heparin   Injectable 5000 Unit(s) SubCutaneous every 12 hours  levothyroxine 175 MICROGram(s) Oral <User Schedule>  levothyroxine 87.5 MICROGram(s) Oral <User Schedule>  pantoprazole    Tablet 40 milliGRAM(s) Oral before breakfast  sertraline 200 milliGRAM(s) Oral daily  sodium chloride 0.9%. 1000 milliLiter(s) (150 mL/Hr) IV Continuous <Continuous>  traZODone 100 milliGRAM(s) Oral at bedtime    MEDICATIONS  (PRN):  acetaminophen     Tablet .. 650 milliGRAM(s) Oral every 6 hours PRN Temp greater or equal to 38C (100.4F), Mild Pain (1 - 3)  aluminum hydroxide/magnesium hydroxide/simethicone Suspension 30 milliLiter(s) Oral every 4 hours PRN Dyspepsia  melatonin 3 milliGRAM(s) Oral at bedtime PRN Insomnia  ondansetron Injectable 4 milliGRAM(s) IV Push every 8 hours PRN Nausea and/or Vomiting      Allergies    Reglan (Unknown)  shellfish (Unknown)  shellfish (Rash)    Intolerances        I&O's Detail    11 Jan 2024 07:01  -  12 Jan 2024 07:00  --------------------------------------------------------  IN:    sodium chloride 0.9%: 1650 mL  Total IN: 1650 mL    OUT:  Total OUT: 0 mL    Total NET: 1650 mL          Vital Signs Last 24 Hrs  T(C): 36.7 (12 Jan 2024 08:51), Max: 36.7 (12 Jan 2024 08:51)  T(F): 98 (12 Jan 2024 08:51), Max: 98 (12 Jan 2024 08:51)  HR: 72 (12 Jan 2024 11:00) (72 - 83)  BP: 136/83 (12 Jan 2024 11:00) (124/52 - 160/80)  BP(mean): 83 (12 Jan 2024 08:51) (68 - 83)  RR: 20 (12 Jan 2024 11:00) (18 - 27)  SpO2: 95% (12 Jan 2024 11:00) (95% - 99%)    Parameters below as of 12 Jan 2024 11:00  Patient On (Oxygen Delivery Method): room air      Daily     Daily     PHYSICAL EXAM:  General: alert. awake Ox3  HEENT: MMM  CV: s1s2 rrr  LUNGS: B/L CTA  EXT: no edema    LABS:                        10.2   9.35  )-----------( 202      ( 12 Jan 2024 07:43 )             30.3     01-12    141  |  113<H>  |  22  ----------------------------<  105<H>  4.4   |  24  |  1.79<H>    Ca    10.2<H>      12 Jan 2024 07:43  Phos  3.5     01-12    TPro  x   /  Alb  3.1<L>  /  TBili  x   /  DBili  x   /  AST  x   /  ALT  x   /  AlkPhos  x   01-12      Urinalysis Basic - ( 12 Jan 2024 07:43 )    Color: x / Appearance: x / SG: x / pH: x  Gluc: 105 mg/dL / Ketone: x  / Bili: x / Urobili: x   Blood: x / Protein: x / Nitrite: x   Leuk Esterase: x / RBC: x / WBC x   Sq Epi: x / Non Sq Epi: x / Bacteria: x      Phosphorus: 3.5 mg/dL (01-12 @ 07:43)       NEPHROLOGY INTERVAL HPI/OVERNIGHT EVENTS:  01-12-24 @ 12:54    1/12 1/11--Feeling better.  Abd discomfort improved and able to eat better.      HPI:  47 yo woman with pmhx of Thyroid CA post total thyroidectomy, hx of Hypocalcemia on Calcium and Rocaltrol 1.5 mcg daily since thyroidectomy.  Had been on same Calcium and Vit D supp for last 25 years with Calcium level WNL.  In 11/2023, noted for Caldium level of >11, Calcium supp was decreased to daily dosing.  Developed abdominal discomfort with bloating and decreased appetite and occ nausea leading to GI evaluation with Dr Fierro.  Lab done revealed Clacium of 12.5 and creat of 2.5 with no previous hx of CKD and sent to ED.  In ED, Calcium of 13.5 and Creat of 2.6.  Denies any excessive mild consumption or any new OTC meds.  No NSAIDS.    PMHX and PSHX.  --hx of Thyroid CA --total thyroidectomy  without known reimplant of parathyroid gland--25 years ago.  --Hypocalcemia since thyroidectomy  --Hx of Ureterocele repair as a child  --Post recent spinal surgery.  --Hx of Gastric sleeve 2014.      MEDICATIONS  (STANDING):  atorvastatin 80 milliGRAM(s) Oral at bedtime  famotidine    Tablet 20 milliGRAM(s) Oral at bedtime  heparin   Injectable 5000 Unit(s) SubCutaneous every 12 hours  levothyroxine 175 MICROGram(s) Oral <User Schedule>  levothyroxine 87.5 MICROGram(s) Oral <User Schedule>  pantoprazole    Tablet 40 milliGRAM(s) Oral before breakfast  sertraline 200 milliGRAM(s) Oral daily  sodium chloride 0.9%. 1000 milliLiter(s) (150 mL/Hr) IV Continuous <Continuous>  traZODone 100 milliGRAM(s) Oral at bedtime    MEDICATIONS  (PRN):  acetaminophen     Tablet .. 650 milliGRAM(s) Oral every 6 hours PRN Temp greater or equal to 38C (100.4F), Mild Pain (1 - 3)  aluminum hydroxide/magnesium hydroxide/simethicone Suspension 30 milliLiter(s) Oral every 4 hours PRN Dyspepsia  melatonin 3 milliGRAM(s) Oral at bedtime PRN Insomnia  ondansetron Injectable 4 milliGRAM(s) IV Push every 8 hours PRN Nausea and/or Vomiting      Allergies    Reglan (Unknown)  shellfish (Unknown)  shellfish (Rash)    Intolerances        I&O's Detail    11 Jan 2024 07:01  -  12 Jan 2024 07:00  --------------------------------------------------------  IN:    sodium chloride 0.9%: 1650 mL  Total IN: 1650 mL    OUT:  Total OUT: 0 mL    Total NET: 1650 mL          Vital Signs Last 24 Hrs  T(C): 36.7 (12 Jan 2024 08:51), Max: 36.7 (12 Jan 2024 08:51)  T(F): 98 (12 Jan 2024 08:51), Max: 98 (12 Jan 2024 08:51)  HR: 72 (12 Jan 2024 11:00) (72 - 83)  BP: 136/83 (12 Jan 2024 11:00) (124/52 - 160/80)  BP(mean): 83 (12 Jan 2024 08:51) (68 - 83)  RR: 20 (12 Jan 2024 11:00) (18 - 27)  SpO2: 95% (12 Jan 2024 11:00) (95% - 99%)    Parameters below as of 12 Jan 2024 11:00  Patient On (Oxygen Delivery Method): room air      Daily     Daily     PHYSICAL EXAM:  General: alert. awake Ox3  HEENT: MMM  CV: s1s2 rrr  LUNGS: B/L CTA  EXT: no edema    LABS:                        10.2   9.35  )-----------( 202      ( 12 Jan 2024 07:43 )             30.3     01-12    141  |  113<H>  |  22  ----------------------------<  105<H>  4.4   |  24  |  1.79<H>    Ca    10.2<H>      12 Jan 2024 07:43  Phos  3.5     01-12    TPro  x   /  Alb  3.1<L>  /  TBili  x   /  DBili  x   /  AST  x   /  ALT  x   /  AlkPhos  x   01-12      Urinalysis Basic - ( 12 Jan 2024 07:43 )    Color: x / Appearance: x / SG: x / pH: x  Gluc: 105 mg/dL / Ketone: x  / Bili: x / Urobili: x   Blood: x / Protein: x / Nitrite: x   Leuk Esterase: x / RBC: x / WBC x   Sq Epi: x / Non Sq Epi: x / Bacteria: x      Phosphorus: 3.5 mg/dL (01-12 @ 07:43)       NEPHROLOGY INTERVAL HPI/OVERNIGHT EVENTS:  01-12-24 @ 12:54    1/12 feels better tolerating po  1/11--Feeling better.  Abd discomfort improved and able to eat better.      HPI:  49 yo woman with pmhx of Thyroid CA post total thyroidectomy, hx of Hypocalcemia on Calcium and Rocaltrol 1.5 mcg daily since thyroidectomy.  Had been on same Calcium and Vit D supp for last 25 years with Calcium level WNL.  In 11/2023, noted for Caldium level of >11, Calcium supp was decreased to daily dosing.  Developed abdominal discomfort with bloating and decreased appetite and occ nausea leading to GI evaluation with Dr Fierro.  Lab done revealed Clacium of 12.5 and creat of 2.5 with no previous hx of CKD and sent to ED.  In ED, Calcium of 13.5 and Creat of 2.6.  Denies any excessive mild consumption or any new OTC meds.  No NSAIDS.    PMHX and PSHX.  --hx of Thyroid CA --total thyroidectomy  without known reimplant of parathyroid gland--25 years ago.  --Hypocalcemia since thyroidectomy  --Hx of Ureterocele repair as a child  --Post recent spinal surgery.  --Hx of Gastric sleeve 2014.      MEDICATIONS  (STANDING):  atorvastatin 80 milliGRAM(s) Oral at bedtime  famotidine    Tablet 20 milliGRAM(s) Oral at bedtime  heparin   Injectable 5000 Unit(s) SubCutaneous every 12 hours  levothyroxine 175 MICROGram(s) Oral <User Schedule>  levothyroxine 87.5 MICROGram(s) Oral <User Schedule>  pantoprazole    Tablet 40 milliGRAM(s) Oral before breakfast  sertraline 200 milliGRAM(s) Oral daily  sodium chloride 0.9%. 1000 milliLiter(s) (150 mL/Hr) IV Continuous <Continuous>  traZODone 100 milliGRAM(s) Oral at bedtime    MEDICATIONS  (PRN):  acetaminophen     Tablet .. 650 milliGRAM(s) Oral every 6 hours PRN Temp greater or equal to 38C (100.4F), Mild Pain (1 - 3)  aluminum hydroxide/magnesium hydroxide/simethicone Suspension 30 milliLiter(s) Oral every 4 hours PRN Dyspepsia  melatonin 3 milliGRAM(s) Oral at bedtime PRN Insomnia  ondansetron Injectable 4 milliGRAM(s) IV Push every 8 hours PRN Nausea and/or Vomiting      Allergies    Reglan (Unknown)  shellfish (Unknown)  shellfish (Rash)    Intolerances        I&O's Detail    11 Jan 2024 07:01  -  12 Jan 2024 07:00  --------------------------------------------------------  IN:    sodium chloride 0.9%: 1650 mL  Total IN: 1650 mL    OUT:  Total OUT: 0 mL    Total NET: 1650 mL          Vital Signs Last 24 Hrs  T(C): 36.7 (12 Jan 2024 08:51), Max: 36.7 (12 Jan 2024 08:51)  T(F): 98 (12 Jan 2024 08:51), Max: 98 (12 Jan 2024 08:51)  HR: 72 (12 Jan 2024 11:00) (72 - 83)  BP: 136/83 (12 Jan 2024 11:00) (124/52 - 160/80)  BP(mean): 83 (12 Jan 2024 08:51) (68 - 83)  RR: 20 (12 Jan 2024 11:00) (18 - 27)  SpO2: 95% (12 Jan 2024 11:00) (95% - 99%)    Parameters below as of 12 Jan 2024 11:00  Patient On (Oxygen Delivery Method): room air      Daily     Daily     PHYSICAL EXAM:  General: alert. awake NAD  HEENT: MMM  CV: s1s2 rrr  LUNGS: B/L CTA  EXT: no edema    LABS:                        10.2   9.35  )-----------( 202      ( 12 Jan 2024 07:43 )             30.3     01-12    141  |  113<H>  |  22  ----------------------------<  105<H>  4.4   |  24  |  1.79<H>    Ca    10.2<H>      12 Jan 2024 07:43  Phos  3.5     01-12    TPro  x   /  Alb  3.1<L>  /  TBili  x   /  DBili  x   /  AST  x   /  ALT  x   /  AlkPhos  x   01-12      Urinalysis Basic - ( 12 Jan 2024 07:43 )    Color: x / Appearance: x / SG: x / pH: x  Gluc: 105 mg/dL / Ketone: x  / Bili: x / Urobili: x   Blood: x / Protein: x / Nitrite: x   Leuk Esterase: x / RBC: x / WBC x   Sq Epi: x / Non Sq Epi: x / Bacteria: x      Phosphorus: 3.5 mg/dL (01-12 @ 07:43)

## 2024-01-12 NOTE — DISCHARGE NOTE PROVIDER - HOSPITAL COURSE
CC: hyperCa  HPI and Hospital Course: 47 y/o female with PMHX of thyroid cancer s/p total thyroidectomy, hypocalcemia, ureterocele and spinal fusion who presented to  after being referred for admission for abnormal labs-- elevated CA and ARF.      Unclear etiology but improved w IVF and holding of supplements. For close outpt f/u with renal this coming week w labs in 4 days and office f/u in 7 days      D/c to home  I have spent 32 min on day of d/c coordinating care.  See progress note for problem based plan.

## 2024-01-12 NOTE — DISCHARGE NOTE PROVIDER - CARE PROVIDER_API CALL
Gene Kimble  Internal Medicine  [address], [address]  [place] [address]  Phone: [phone]  Fax: [phone]  Follow Up Time: 1 week    Eric Topete  Endocrinology/Metab/Diabetes  [address] [place] [address]  Phone: [phone]  Fax: [phone]  Follow Up Time: 2 weeks   Gene Kimble  Internal Medicine  [address], [address]  [place] [address]  Phone: [phone]  Fax: [phone]  Follow Up Time: 1 week    Eric Topete  Endocrinology/Metab/Diabetes  [address] [place] [address]  Phone: [phone]  Fax: [phone]  Follow Up Time: 2 weeks

## 2024-01-12 NOTE — DISCHARGE NOTE PROVIDER - NSDCFUSCHEDAPPT_GEN_ALL_CORE_FT
Yun, Suk-Hyeon  [facility]  NEPHRO [address]  Scheduled Appointment: 01/19/2024     Yun, Suk-Hyeon  [facility]  NEPHRO [address]  Scheduled Appointment: 01/19/2024

## 2024-01-12 NOTE — DISCHARGE NOTE PROVIDER - NSDCCAREPROVSEEN_GEN_ALL_CORE_FT
Thu Jaimes ([facility])  Yun, Suk-Hyeon (nephrology) Thu Jaimes ([facility])  Yun, Suk-Hyeon (nephrology)

## 2024-01-12 NOTE — PROGRESS NOTE ADULT - ASSESSMENT
47 yo woman with hx of total Thyroidectomy and hx of Hypocalcemia following surgery on Calcium and Vit D supp without dose change for 25 years.  CLAUDIA due to hypercalcemia likely.  IV NS for Calcium excretion.  Check Vit D and PTH to further assess.  Follow up renal sonogram with hx of ureteral surgery.    1/11 SY  --CLAUDIA due to volume depletion with Hypercalcemia : continue IV NS for Calcium excretion.  --Hypercalcemia : unclear etiology and unlikely due to calcium and vit D supp.    Of concern is decreased HGB with hydration : continue to monitor --follow up work up for Paraproteins.    check Urine prot/creat ratio.  --Renal USG with atrophic Left kidney --c/w with hx of Left ureteral surgery.   47 yo woman with hx of total Thyroidectomy and hx of Hypocalcemia following surgery on Calcium and Vit D supp without dose change for 25 years.  CLAUDIA due to hypercalcemia likely.  IV NS for Calcium excretion.  Check Vit D and PTH to further assess.  Follow up renal sonogram with hx of ureteral surgery.    1/11 SY  --CLAUDIA due to volume depletion with Hypercalcemia : continue IV NS for Calcium excretion.  --Hypercalcemia : unclear etiology and unlikely due to calcium and vit D supp.    Of concern is decreased HGB with hydration : continue to monitor --follow up work up for Paraproteins.    check Urine prot/creat ratio.  --Renal USG with atrophic Left kidney --c/w with hx of Left ureteral surgery.    1/12 MK   - CLAUDIA/ volume depletion from hypercalcemia     cw NS until dc today    bmp on 1/17 [facility] labs    fu with dr zuluaga 1/19 noon     64 oz of water per day     no calcium or rocalcitriol until fu with dr zan antonio spep and upep as outpt   dw dr frazier

## 2024-01-12 NOTE — DISCHARGE NOTE PROVIDER - CARE PROVIDERS DIRECT ADDRESSES
[email or web address]

## 2024-01-13 ENCOUNTER — TRANSCRIPTION ENCOUNTER (OUTPATIENT)
Age: 49
End: 2024-01-13

## 2024-01-13 LAB
% ALBUMIN: 54.9 % — SIGNIFICANT CHANGE UP
% ALBUMIN: 54.9 % — SIGNIFICANT CHANGE UP
% ALPHA 1: 4.6 % — SIGNIFICANT CHANGE UP
% ALPHA 1: 4.6 % — SIGNIFICANT CHANGE UP
% ALPHA 2: 12.6 % — SIGNIFICANT CHANGE UP
% ALPHA 2: 12.6 % — SIGNIFICANT CHANGE UP
% BETA: 13.6 % — SIGNIFICANT CHANGE UP
% BETA: 13.6 % — SIGNIFICANT CHANGE UP
% GAMMA: 14.3 % — SIGNIFICANT CHANGE UP
% GAMMA: 14.3 % — SIGNIFICANT CHANGE UP
ALBUMIN SERPL ELPH-MCNC: 4 G/DL — SIGNIFICANT CHANGE UP (ref 3.6–5.5)
ALBUMIN SERPL ELPH-MCNC: 4 G/DL — SIGNIFICANT CHANGE UP (ref 3.6–5.5)
ALBUMIN/GLOB SERPL ELPH: 1.2 RATIO — SIGNIFICANT CHANGE UP
ALBUMIN/GLOB SERPL ELPH: 1.2 RATIO — SIGNIFICANT CHANGE UP
ALPHA1 GLOB SERPL ELPH-MCNC: 0.3 G/DL — SIGNIFICANT CHANGE UP (ref 0.1–0.4)
ALPHA1 GLOB SERPL ELPH-MCNC: 0.3 G/DL — SIGNIFICANT CHANGE UP (ref 0.1–0.4)
ALPHA2 GLOB SERPL ELPH-MCNC: 0.9 G/DL — SIGNIFICANT CHANGE UP (ref 0.5–1)
ALPHA2 GLOB SERPL ELPH-MCNC: 0.9 G/DL — SIGNIFICANT CHANGE UP (ref 0.5–1)
B-GLOBULIN SERPL ELPH-MCNC: 1 G/DL — SIGNIFICANT CHANGE UP (ref 0.5–1)
B-GLOBULIN SERPL ELPH-MCNC: 1 G/DL — SIGNIFICANT CHANGE UP (ref 0.5–1)
GAMMA GLOBULIN: 1 G/DL — SIGNIFICANT CHANGE UP (ref 0.6–1.6)
GAMMA GLOBULIN: 1 G/DL — SIGNIFICANT CHANGE UP (ref 0.6–1.6)
INTERPRETATION SERPL IFE-IMP: SIGNIFICANT CHANGE UP
INTERPRETATION SERPL IFE-IMP: SIGNIFICANT CHANGE UP
PROT PATTERN SERPL ELPH-IMP: SIGNIFICANT CHANGE UP
PROT PATTERN SERPL ELPH-IMP: SIGNIFICANT CHANGE UP
PROT SERPL-MCNC: 7.3 G/DL — SIGNIFICANT CHANGE UP (ref 6–8.3)
PROT SERPL-MCNC: 7.3 G/DL — SIGNIFICANT CHANGE UP (ref 6–8.3)

## 2024-01-16 LAB — PTH RELATED PROT SERPL-MCNC: <2 PMOL/L — SIGNIFICANT CHANGE UP

## 2024-01-17 LAB
ALBUMIN SERPL ELPH-MCNC: 4.6 G/DL
ANION GAP SERPL CALC-SCNC: 16 MMOL/L
BASOPHILS # BLD AUTO: 0.02 K/UL
BASOPHILS NFR BLD AUTO: 0.2 %
BUN SERPL-MCNC: 23 MG/DL
CA-I SERPL-SCNC: 5.2 MG/DL
CALCIUM SERPL-MCNC: 10.2 MG/DL
CHLORIDE SERPL-SCNC: 101 MMOL/L
CO2 SERPL-SCNC: 22 MMOL/L
CREAT SERPL-MCNC: 1.61 MG/DL
EGFR: 39 ML/MIN/1.73M2
EOSINOPHIL # BLD AUTO: 0.15 K/UL
EOSINOPHIL NFR BLD AUTO: 1.4 %
GLUCOSE SERPL-MCNC: 105 MG/DL
HCT VFR BLD CALC: 34.8 %
HGB BLD-MCNC: 11.6 G/DL
IMM GRANULOCYTES NFR BLD AUTO: 0.6 %
LYMPHOCYTES # BLD AUTO: 2.43 K/UL
LYMPHOCYTES NFR BLD AUTO: 23.4 %
MAN DIFF?: NORMAL
MCHC RBC-ENTMCNC: 27.4 PG
MCHC RBC-ENTMCNC: 33.3 GM/DL
MCV RBC AUTO: 82.1 FL
MONOCYTES # BLD AUTO: 0.48 K/UL
MONOCYTES NFR BLD AUTO: 4.6 %
NEUTROPHILS # BLD AUTO: 7.26 K/UL
NEUTROPHILS NFR BLD AUTO: 69.8 %
PHOSPHATE SERPL-MCNC: 4.7 MG/DL
PLATELET # BLD AUTO: 331 K/UL
POTASSIUM SERPL-SCNC: 3.9 MMOL/L
RBC # BLD: 4.24 M/UL
RBC # FLD: 14.2 %
SODIUM SERPL-SCNC: 139 MMOL/L
WBC # FLD AUTO: 10.4 K/UL

## 2024-01-18 DIAGNOSIS — E89.0 POSTPROCEDURAL HYPOTHYROIDISM: ICD-10-CM

## 2024-01-18 DIAGNOSIS — N17.9 ACUTE KIDNEY FAILURE, UNSPECIFIED: ICD-10-CM

## 2024-01-18 DIAGNOSIS — E83.52 HYPERCALCEMIA: ICD-10-CM

## 2024-01-18 DIAGNOSIS — Z85.850 PERSONAL HISTORY OF MALIGNANT NEOPLASM OF THYROID: ICD-10-CM

## 2024-01-18 DIAGNOSIS — F32.A DEPRESSION, UNSPECIFIED: ICD-10-CM

## 2024-01-18 DIAGNOSIS — Z88.8 ALLERGY STATUS TO OTHER DRUGS, MEDICAMENTS AND BIOLOGICAL SUBSTANCES: ICD-10-CM

## 2024-01-18 DIAGNOSIS — Z79.890 HORMONE REPLACEMENT THERAPY: ICD-10-CM

## 2024-01-18 DIAGNOSIS — Z91.013 ALLERGY TO SEAFOOD: ICD-10-CM

## 2024-01-19 ENCOUNTER — APPOINTMENT (OUTPATIENT)
Dept: NEPHROLOGY | Facility: CLINIC | Age: 49
End: 2024-01-19
Payer: COMMERCIAL

## 2024-01-19 VITALS
DIASTOLIC BLOOD PRESSURE: 72 MMHG | BODY MASS INDEX: 34.55 KG/M2 | OXYGEN SATURATION: 98 % | WEIGHT: 183 LBS | SYSTOLIC BLOOD PRESSURE: 124 MMHG | HEART RATE: 66 BPM | HEIGHT: 61 IN | TEMPERATURE: 96.8 F

## 2024-01-19 DIAGNOSIS — E89.0 POSTPROCEDURAL HYPOTHYROIDISM: ICD-10-CM

## 2024-01-19 DIAGNOSIS — E83.52 HYPERCALCEMIA: ICD-10-CM

## 2024-01-19 DIAGNOSIS — Z85.850 PERSONAL HISTORY OF MALIGNANT NEOPLASM OF THYROID: ICD-10-CM

## 2024-01-19 DIAGNOSIS — Z90.3 ACQUIRED ABSENCE OF STOMACH [PART OF]: ICD-10-CM

## 2024-01-19 DIAGNOSIS — E83.51 HYPOCALCEMIA: ICD-10-CM

## 2024-01-19 DIAGNOSIS — N17.9 ACUTE KIDNEY FAILURE, UNSPECIFIED: ICD-10-CM

## 2024-01-19 PROCEDURE — 99215 OFFICE O/P EST HI 40 MIN: CPT

## 2024-01-19 RX ORDER — SERTRALINE HYDROCHLORIDE 100 MG/1
100 TABLET, FILM COATED ORAL
Refills: 0 | Status: ACTIVE | COMMUNITY

## 2024-01-19 RX ORDER — FAMOTIDINE 40 MG/1
40 TABLET, FILM COATED ORAL
Refills: 0 | Status: ACTIVE | COMMUNITY

## 2024-01-19 RX ORDER — OMEPRAZOLE 40 MG/1
40 CAPSULE, DELAYED RELEASE ORAL
Refills: 0 | Status: ACTIVE | COMMUNITY

## 2024-01-19 RX ORDER — LEVONORGESTREL AND ETHINYL ESTRADIOL 0.15-0.03
0.15-0.03 KIT ORAL DAILY
Qty: 90 | Refills: 0 | Status: DISCONTINUED | COMMUNITY
Start: 2023-04-03 | End: 2024-01-19

## 2024-01-19 RX ORDER — TRAZODONE HYDROCHLORIDE 100 MG/1
100 TABLET ORAL
Refills: 0 | Status: ACTIVE | COMMUNITY

## 2024-01-19 RX ORDER — LEVOTHYROXINE SODIUM 0.17 MG/1
175 TABLET ORAL
Refills: 0 | Status: ACTIVE | COMMUNITY

## 2024-01-19 RX ORDER — ROSUVASTATIN CALCIUM 20 MG/1
20 TABLET, FILM COATED ORAL
Refills: 0 | Status: ACTIVE | COMMUNITY

## 2024-01-19 NOTE — PHYSICAL EXAM
[General Appearance - Alert] : alert [General Appearance - In No Acute Distress] : in no acute distress [Sclera] : the sclera and conjunctiva were normal [PERRL With Normal Accommodation] : pupils were equal in size, round, and reactive to light [Outer Ear] : the ears and nose were normal in appearance [Neck Appearance] : the appearance of the neck was normal [] : no respiratory distress [Auscultation Breath Sounds / Voice Sounds] : lungs were clear to auscultation bilaterally [Heart Rate And Rhythm] : heart rate was normal and rhythm regular [Heart Sounds] : normal S1 and S2 [Edema] : there was no peripheral edema [Bowel Sounds] : normal bowel sounds [Abdomen Soft] : soft

## 2024-01-19 NOTE — ASSESSMENT
[FreeTextEntry1] : 47 yo woman with chronic hx of Hypocalcemia on Ca CO3 and Vit D supplement post admission for CLAUDIA and Hypercalcemia. CLAUDIA and Hypercalcemia improved. Etiology unclear.  Pt had been on high dose Calcium and Vit D supplement as well as high Calcium diet. Continue to hold all supplements. Continue to follow with repeat labs.

## 2024-01-19 NOTE — HISTORY OF PRESENT ILLNESS
[FreeTextEntry1] : Ms. Msoes present for follow up of  admission for CLAUDIA and Hypercalcemia. 47 yo woman with pmhx of Thyroid ca, post total thyroidectomy many years ago maintained on CaCO3 and Calcitriol 1.5 mcg daily. Admitted to  on 1/10 due to CLAUDIA with Hypercalcemia.  Noted with Calcium level of 13.2 with creat of 2.65 without previous hx of CKD. Responded NS hydration with gradual improvement of creat and calcium levels. Work up was non-revealing for etiology.  Renal sonogram was notable for atrophic left kidney ( hx of Left Ureterocele  repair).  Pt reports feeling well now.   Abd discomfort has resolved.

## 2024-01-26 ENCOUNTER — NON-APPOINTMENT (OUTPATIENT)
Age: 49
End: 2024-01-26

## 2024-01-26 LAB
24R-OH-CALCIDIOL SERPL-MCNC: 8.8 PG/ML
25(OH)D3 SERPL-MCNC: 34 NG/ML
ALBUMIN SERPL ELPH-MCNC: 4.3 G/DL
ANION GAP SERPL CALC-SCNC: 16 MMOL/L
BASOPHILS # BLD AUTO: 0.02 K/UL
BASOPHILS NFR BLD AUTO: 0.2 %
BUN SERPL-MCNC: 23 MG/DL
CALCIUM SERPL-MCNC: 9.8 MG/DL
CHLORIDE SERPL-SCNC: 106 MMOL/L
CO2 SERPL-SCNC: 21 MMOL/L
CREAT SERPL-MCNC: 1.29 MG/DL
EGFR: 51 ML/MIN/1.73M2
EOSINOPHIL # BLD AUTO: 0.14 K/UL
EOSINOPHIL NFR BLD AUTO: 1.6 %
FERRITIN SERPL-MCNC: 117 NG/ML
GLUCOSE SERPL-MCNC: 97 MG/DL
HCT VFR BLD CALC: 32.7 %
HGB BLD-MCNC: 10.7 G/DL
IMM GRANULOCYTES NFR BLD AUTO: 0.3 %
IRON SATN MFR SERPL: 17 %
IRON SERPL-MCNC: 43 UG/DL
LYMPHOCYTES # BLD AUTO: 2.55 K/UL
LYMPHOCYTES NFR BLD AUTO: 28.3 %
MAN DIFF?: NORMAL
MCHC RBC-ENTMCNC: 27.6 PG
MCHC RBC-ENTMCNC: 32.7 GM/DL
MCV RBC AUTO: 84.3 FL
MONOCYTES # BLD AUTO: 0.51 K/UL
MONOCYTES NFR BLD AUTO: 5.7 %
NEUTROPHILS # BLD AUTO: 5.77 K/UL
NEUTROPHILS NFR BLD AUTO: 63.9 %
PHOSPHATE SERPL-MCNC: 4.5 MG/DL
PLATELET # BLD AUTO: 321 K/UL
POTASSIUM SERPL-SCNC: 4 MMOL/L
RBC # BLD: 3.88 M/UL
RBC # FLD: 14.3 %
SODIUM SERPL-SCNC: 144 MMOL/L
TIBC SERPL-MCNC: 259 UG/DL
UIBC SERPL-MCNC: 216 UG/DL
WBC # FLD AUTO: 9.02 K/UL

## 2024-09-17 DIAGNOSIS — E83.52 HYPERCALCEMIA: ICD-10-CM

## 2024-09-17 DIAGNOSIS — N17.9 ACUTE KIDNEY FAILURE, UNSPECIFIED: ICD-10-CM

## 2024-09-26 DIAGNOSIS — N18.31 CHRONIC KIDNEY DISEASE, STAGE 3A: ICD-10-CM

## 2024-09-26 DIAGNOSIS — N39.0 URINARY TRACT INFECTION, SITE NOT SPECIFIED: ICD-10-CM

## 2024-09-26 LAB
24R-OH-CALCIDIOL SERPL-MCNC: 41.7 PG/ML
25(OH)D3 SERPL-MCNC: 61 NG/ML
ALBUMIN SERPL ELPH-MCNC: 4.2 G/DL
ANION GAP SERPL CALC-SCNC: 15 MMOL/L
APPEARANCE: CLEAR
BACTERIA: ABNORMAL /HPF
BASOPHILS # BLD AUTO: 0.02 K/UL
BASOPHILS NFR BLD AUTO: 0.2 %
BILIRUBIN URINE: NEGATIVE
BLOOD URINE: ABNORMAL
BUN SERPL-MCNC: 25 MG/DL
CALCIUM SERPL-MCNC: 9.3 MG/DL
CAST: 0 /LPF
CHLORIDE SERPL-SCNC: 100 MMOL/L
CO2 SERPL-SCNC: 20 MMOL/L
COLOR: YELLOW
CREAT SERPL-MCNC: 1.34 MG/DL
EGFR: 48 ML/MIN/1.73M2
EOSINOPHIL # BLD AUTO: 0.07 K/UL
EOSINOPHIL NFR BLD AUTO: 0.7 %
EPITHELIAL CELLS: 11 /HPF
GLUCOSE QUALITATIVE U: NEGATIVE MG/DL
GLUCOSE SERPL-MCNC: 94 MG/DL
HCT VFR BLD CALC: 37 %
HGB BLD-MCNC: 11.3 G/DL
IMM GRANULOCYTES NFR BLD AUTO: 0.4 %
KETONES URINE: ABNORMAL MG/DL
LEUKOCYTE ESTERASE URINE: ABNORMAL
LYMPHOCYTES # BLD AUTO: 2.53 K/UL
LYMPHOCYTES NFR BLD AUTO: 25.2 %
MAN DIFF?: NORMAL
MCHC RBC-ENTMCNC: 24.5 PG
MCHC RBC-ENTMCNC: 30.5 GM/DL
MCV RBC AUTO: 80.3 FL
MICROSCOPIC-UA: NORMAL
MONOCYTES # BLD AUTO: 0.41 K/UL
MONOCYTES NFR BLD AUTO: 4.1 %
NEUTROPHILS # BLD AUTO: 6.96 K/UL
NEUTROPHILS NFR BLD AUTO: 69.4 %
NITRITE URINE: NEGATIVE
PH URINE: 5.5
PHOSPHATE SERPL-MCNC: 3.3 MG/DL
PLATELET # BLD AUTO: 373 K/UL
POTASSIUM SERPL-SCNC: 4.4 MMOL/L
PROTEIN URINE: 30 MG/DL
RBC # BLD: 4.61 M/UL
RBC # FLD: 16.1 %
RED BLOOD CELLS URINE: 2 /HPF
SODIUM SERPL-SCNC: 135 MMOL/L
SPECIFIC GRAVITY URINE: 1.02
UROBILINOGEN URINE: 0.2 MG/DL
WBC # FLD AUTO: 10.03 K/UL
WHITE BLOOD CELLS URINE: 8 /HPF